# Patient Record
Sex: MALE | Race: WHITE | Employment: OTHER | URBAN - METROPOLITAN AREA
[De-identification: names, ages, dates, MRNs, and addresses within clinical notes are randomized per-mention and may not be internally consistent; named-entity substitution may affect disease eponyms.]

---

## 2017-01-25 RX ORDER — RAMIPRIL 10 MG/1
CAPSULE ORAL
Qty: 30 CAP | Refills: 6 | Status: SHIPPED | OUTPATIENT
Start: 2017-01-25 | End: 2017-04-27 | Stop reason: SDUPTHER

## 2017-03-21 ENCOUNTER — OFFICE VISIT (OUTPATIENT)
Dept: CARDIOLOGY CLINIC | Age: 77
End: 2017-03-21

## 2017-03-21 VITALS
DIASTOLIC BLOOD PRESSURE: 70 MMHG | HEART RATE: 57 BPM | HEIGHT: 73 IN | BODY MASS INDEX: 28.76 KG/M2 | WEIGHT: 217 LBS | SYSTOLIC BLOOD PRESSURE: 150 MMHG | OXYGEN SATURATION: 97 %

## 2017-03-21 DIAGNOSIS — R06.09 DOE (DYSPNEA ON EXERTION): ICD-10-CM

## 2017-03-21 DIAGNOSIS — E78.5 DYSLIPIDEMIA: ICD-10-CM

## 2017-03-21 DIAGNOSIS — Z90.5 HISTORY OF NEPHRECTOMY: ICD-10-CM

## 2017-03-21 DIAGNOSIS — N28.9 RENAL INSUFFICIENCY: ICD-10-CM

## 2017-03-21 DIAGNOSIS — I51.9 DIASTOLIC DYSFUNCTION, LEFT VENTRICLE: ICD-10-CM

## 2017-03-21 DIAGNOSIS — I82.402 DEEP VEIN THROMBOSIS (DVT) OF LEFT LOWER EXTREMITY, UNSPECIFIED CHRONICITY, UNSPECIFIED VEIN (HCC): ICD-10-CM

## 2017-03-21 DIAGNOSIS — I25.10 ATHEROSCLEROSIS OF NATIVE CORONARY ARTERY OF NATIVE HEART WITHOUT ANGINA PECTORIS: Primary | ICD-10-CM

## 2017-03-21 DIAGNOSIS — I11.9 BENIGN HYPERTENSIVE HEART DISEASE WITHOUT HEART FAILURE: ICD-10-CM

## 2017-03-21 DIAGNOSIS — M79.10 MYALGIA: ICD-10-CM

## 2017-03-21 NOTE — MR AVS SNAPSHOT
Visit Information Date & Time Provider Department Dept. Phone Encounter #  
 3/21/2017  2:00 PM James Nicholson MD [facility] [phone] [id number] Your Appointments 3/21/2017  2:00 PM  
Follow Up with James Nicholson MD  
[facility] ([facility]) Appt Note: R/s'd with the patient Manuel Lainez [phone] [phone] [address] [place] [address] [address] Upcoming Health Maintenance Date Due DTaP/Tdap/Td series (1 - Tdap) 5/6/1963 FOBT Q 1 YEAR AGE 50-75 5/6/1992 ZOSTER VACCINE AGE 60> 5/6/2002 GLAUCOMA SCREENING Q2Y 5/6/2007 Pneumococcal 65+ Low/Medium Risk (1 of 2 - PCV13) 5/6/2007 MEDICARE YEARLY EXAM 5/6/2007 INFLUENZA AGE 9 TO ADULT 8/1/2016 Allergies as of 3/21/2017  Review Complete On: 3/21/2017 By: James Nicholson MD  
  
 Severity Noted Reaction Type Reactions Lipitor [Atorvastatin]  02/02/2011    Myalgia Morphine  02/02/2011    Other (comments) Low blood pressure Niaspan [Niacin]  04/19/2011    Not Reported This Time Statins-hmg-coa Reductase Inhibitors  04/19/2011    Myalgia Zetia [Ezetimibe]  02/02/2011    Myalgia Current Immunizations  Never Reviewed No immunizations on file. Not reviewed this visit You Were Diagnosed With   
  
 Codes Comments Atherosclerosis of native coronary artery of native heart without angina pectoris    -  Primary ICD-10-CM: I25.10 ICD-9-CM: 414.01 Benign hypertensive heart disease without heart failure     ICD-10-CM: I11.9 ICD-9-CM: 402.10 Dyslipidemia     ICD-10-CM: E78.5 ICD-9-CM: 272.4 Deep vein thrombosis (DVT) of left lower extremity, unspecified chronicity, unspecified vein (HCC)     ICD-10-CM: I19.546 ICD-9-CM: 453.40 History of nephrectomy     ICD-10-CM: Z90.5 ICD-9-CM: V45.73 Renal insufficiency     ICD-10-CM: N28.9 ICD-9-CM: 593.9 Myalgia     ICD-10-CM: M79.1 ICD-9-CM: 729.1 DESAI (dyspnea on exertion)     ICD-10-CM: R06.09 
ICD-9-CM: 786.09 Diastolic dysfunction, left ventricle     ICD-10-CM: I51.9 ICD-9-CM: 429.9 Vitals BP Pulse Height(growth percentile) Weight(growth percentile) SpO2 BMI  
 150/70 (!) 57 6' 1\" (1.854 m) 217 lb (98.4 kg) 97% 28.63 kg/m2 Smoking Status Former Smoker Vitals History BMI and BSA Data Body Mass Index Body Surface Area  
 28.63 kg/m 2 2.25 m 2 Preferred Pharmacy Pharmacy Name Phone [facility] [address] [place] [address] [phone] Your Updated Medication List  
  
   
This list is accurate as of: 3/21/17 12:53 PM.  Always use your most recent med list.  
  
  
  
  
 ALBUTEROL IN Take 1 Puff by inhalation as needed. clopidogrel 75 mg Tab Commonly known as:  PLAVIX TAKE ONE TABLET BY MOUTH ONCE DAILY FISH OIL 1,000 mg (120 mg-180 mg) Cap Generic drug:  Omega-3-DHA-EPA-Fish Oil Take 2 Caps by mouth two (2) times a day. metoprolol succinate 100 mg tablet Commonly known as:  TOPROL-XL  
TAKE ONE TABLET BY MOUTH ONCE DAILY  
  
 ramipril 10 mg capsule Commonly known as:  ALTACE  
TAKE ONE CAPSULE BY MOUTH ONCE DAILY  
  
 VITAMIN D2 PO Take  by mouth. We Performed the Following AMB POC EKG ROUTINE W/ 12 LEADS, INTER & REP [22524 CPT(R)] Introducing [facility]! Gustabo Elliott introduces [employer] patient portal. Now you can access parts of your medical record, email your doctor's office, and request medication refills online. 1. In your internet browser, go to [email or web address] 2. Click on the First Time User? Click Here link in the Sign In box. You will see the New Member Sign Up page. 3. Enter your [employer] Access Code exactly as it appears below.  You will not need to use this code after youve completed the sign-up process. If you do not sign up before the expiration date, you must request a new code. · [employer] Access Code: [email or web address] Expires: 6/19/2017 12:53 PM 
 
4. Enter the last four digits of your Social Security Number (xxxx) and Date of Birth (mm/dd/yyyy) as indicated and click Submit. You will be taken to the next sign-up page. 5. Create a [employer] ID. This will be your [facility] [facility] login ID and cannot be changed, so think of one that is secure and easy to remember. 6. Create a [employer] password. You can change your password at any time. 7. Enter your Password Reset Question and Answer. This can be used at a later time if you forget your password. 8. Enter your e-mail address. You will receive e-mail notification when new information is available in [address] 9. Click Sign Up. You can now view and download portions of your medical record. 10. Click the Download Summary menu link to download a portable copy of your medical information. If you have questions, please visit the Frequently Asked Questions section of the [employer] website. Remember, [employer] is NOT to be used for urgent needs. For medical emergencies, dial 911. Now available from your iPhone and Android! Please provide this summary of care documentation to your next provider. Your primary care clinician is listed as [address] If you have any questions after today's visit, please call [phone].

## 2017-03-21 NOTE — PROGRESS NOTES
HISTORY OF PRESENT ILLNESS  Krista Vegas is a 78 y.o. male. HPI  He has been feeling good. He offers no cardiac complaints. Other than mild dyspnea on exertion chronically, he has no complaints. There has been no worsening of his dyspnea on exertion. He denies resting dyspnea, orthopnea or PND. He has had no palpitations, dizziness or syncope. He denies chest pain. He has had no symptoms to indicate TIA or amaurosis fugax. He has not been able to tolerate the statin at all. He has history of coronary artery disease and had cardiac catheterization on May 27, 2005, which demonstrated:    1. 50% stenosis of the mid-LAD. 2. 50% to 60% tubular stenosis at the ostium of the large 3rd obtuse marginal branch of the circumflex artery. 3. 95% stenosis of the mid right coronary artery. 4. Patent left renal artery. 5. Mildly depressed left ventricular systolic function, with EF in the 50% range, with no significant regional wall motion abnormalities. Subsequent stenting of the right coronary artery with a 3.5 x 18-mm Cypher stent was successful. The right ventricular marginal branch was jailed with stenosis and was balloon angioplastied, with 30% residual stenosis. He has had no recurrent angina since then. He has history of asthma and had some wheezing in the past, which has been stable since he has stopped smoking a pipe. He has history of a DVT of the left lower extremity in August 2003 and had been on Coumadin until May 2005. He underwent an adenosine Cardiolite myocardial perfusion scan on July 5, 2007, which demonstrated small scarring in the inferior, inferior septum, and inferior apex with no significant ischemia, which could be secondary to diaphragmatic attenuation since there were no wall motion abnormalities on gated SPECT imaging. LV function was normal with EF in the 66% range.  He had stress nuclear cardiac imaging as a Lexiscan on 5/17/11, which demonstrated a very small, fixed defect in the apex, most likely secondary to apical thinning, and mild, fixed defect in the inferior wall, most likely secondary to diaphragmatic attenuation. The LV function was normal, with EF in the 67% range. He had follow up stress nuclear cardiac imaging on 3/12/14, which demonstrated moderate sized mild fixed perfusion defect in the basal inferior, mid inferior, and inferoapical wall, which was felt to be related to diaphragmatic attenuation, essentially unchanged. The ejection fraction was in the 62% range. He was a pipe smoker until 2003 and he also had a great deal of exposure to secondhand smoke from his college roommate. He underwent stress nuclear cardiac imaging on 06/01/16 as pharmacological nuclear cardiac imaging, which demonstrated normal perfusion with no evidence of scarring or ischemia. Ejection fraction was in the 68% range. Review of Systems   Constitutional: Negative for malaise/fatigue and weight loss. HENT: Negative for hearing loss. Eyes: Negative for blurred vision and double vision. Respiratory: Negative for shortness of breath. Cardiovascular: Negative for chest pain, palpitations, orthopnea, claudication, leg swelling and PND. Gastrointestinal: Negative for blood in stool, heartburn and melena. Genitourinary: Positive for frequency. Negative for dysuria, hematuria and urgency. Musculoskeletal: Negative for back pain and joint pain. Skin: Negative for itching and rash. Neurological: Negative for dizziness, loss of consciousness, weakness and headaches. Psychiatric/Behavioral: Negative for depression and memory loss. Physical Exam   Constitutional: He is oriented to person, place, and time. He appears well-developed and well-nourished. HENT:   Head: Normocephalic and atraumatic. Eyes: Conjunctivae are normal. Pupils are equal, round, and reactive to light. Neck: Normal range of motion. Neck supple. No JVD present.    Cardiovascular: Normal rate, regular rhythm, S1 normal and S2 normal.   No extrasystoles are present. PMI is not displaced. Exam reveals no friction rub. Murmur heard. Harsh early systolic murmur is present with a grade of 1/6  at the upper right sternal border  Pulses:       Carotid pulses are 3+ on the right side, and 3+ on the left side. Pulmonary/Chest: Effort normal. He has no rales. Abdominal: Soft. There is no tenderness. Musculoskeletal: He exhibits no edema. Neurological: He is alert and oriented to person, place, and time. No cranial nerve deficit. Skin: Skin is warm and dry. Psychiatric: He has a normal mood and affect. His behavior is normal.     Visit Vitals    /70    Pulse (!) 57    Ht 6' 1\" (1.854 m)    Wt 98.4 kg (217 lb)    SpO2 97%    BMI 28.63 kg/m2       Past Medical History:   Diagnosis Date    Asthma     Cardiac catheterization 05/27/2005    LM patent. mLAD 50%. Cx mild. OM3 55%. mRCA 95% (Cypher 3.5 x 18 DAJA). EF 50%. Patent left renal.    Cardiac Holter monitor, normal 06/21/2010    Benign Holter study.  Cardiac nuclear imaging test, low risk 06/01/2016    Low risk. No ischemia or prior infarction. No WMA. EF 68%. Neg EKG on pharm stress test.    Coronary artery disease May 2005    status post stenting of the right coronary artery     DVT (deep venous thrombosis) ([facility] [address] August 2003    left lower extremity    Dyslipidemia     History of nephrectomy 1998    right, secondary to cancer    History of vasectomy     Hypertension      Renal insufficiency     mild, with a creatinine of 1.6 prior to cardiac catheterization in May 2005       Social History     Social History    Marital status:      Spouse name: N/A    Number of children: N/A    Years of education: N/A     Occupational History    Not on file.      Social History Main Topics    Smoking status: Former Smoker     Years: 40.00     Types: Pipe     Quit date: 4/19/2008    Smokeless tobacco: Never Used   [address] [place] [address] Alcohol use No    Drug use: No    Sexual activity: Not on file     Other Topics Concern    Not on file     Social History Narrative       Family History   Problem Relation Age of Onset    Heart Attack Mother       at age 79 from MI. She also had enlarged heart and asthma.  Hypertension Mother     Cancer Father      Liver cancer       Past Surgical History:   Procedure Laterality Date    HX CORONARY STENT PLACEMENT  05    RCA with 3.5 x 18-mm Cypher stent      HX HEART CATHETERIZATION  05    HX NEPHRECTOMY      right     HX VASECTOMY         Current Outpatient Prescriptions   Medication Sig Dispense Refill    ramipril (ALTACE) 10 mg capsule TAKE ONE CAPSULE BY MOUTH ONCE DAILY 30 Cap 6    metoprolol succinate (TOPROL-XL) 100 mg tablet TAKE ONE TABLET BY MOUTH ONCE DAILY 30 Tab 8    clopidogrel (PLAVIX) 75 mg tablet TAKE ONE TABLET BY MOUTH ONCE DAILY 30 Tab 6    ERGOCALCIFEROL, VITAMIN D2, (VITAMIN D2 PO) Take  by mouth.  Omega-3-DHA-EPA-Fish Oil (FISH OIL) 1,000 (120-180) mg Cap Take 2 Caps by mouth two (2) times a day.  ALBUTEROL IN Take 1 Puff by inhalation as needed. EKG: normal EKG, normal sinus rhythm, unchanged from previous tracings, sinus bradycardia, upper limit ID  .  ASSESSMENT and PLAN  Encounter Diagnoses   Name Primary?  Coronary artery disease, status post stenting of the RCA in May 2005/EF 50%. Yes    Hypertension      Dyslipidemia     History of DVT (deep venous thrombosis) in left lower extremity.  History of right nephrectomy due to renal cell carcinoma in .  Mild renal insufficiency     Myalgia, related to statin.  DESAI (dyspnea on exertion)     Diastolic dysfunction, left ventricle    Cardiac wise, he has been doing very well. He has had no symptoms to indicate angina or cardiac decompensation. He has not been able to tolerate the statin because of severe myalgia and he is not willing to consider a retrial of statin. His mild dyspnea on exertion seems to be secondary to left ventricular diastolic dysfunction and for which he was advised to exercise more. There appears to be no indication for diuretic treatment at this time.

## 2017-03-21 NOTE — PROGRESS NOTES
1. Have you been to the ER, urgent care clinic since your last visit? Hospitalized since your last visit? no  2. Have you seen or consulted any other health care providers outside of the [address] [place] [address] since your last visit? Include any pap smears or colon screening.   no

## 2017-04-27 RX ORDER — RAMIPRIL 10 MG/1
10 CAPSULE ORAL DAILY
Qty: 90 CAP | Refills: 3 | Status: SHIPPED | OUTPATIENT
Start: 2017-04-27 | End: 2018-05-15 | Stop reason: SDUPTHER

## 2017-05-08 RX ORDER — CLOPIDOGREL BISULFATE 75 MG/1
TABLET ORAL
Qty: 30 TAB | Refills: 11 | Status: SHIPPED | OUTPATIENT
Start: 2017-05-08 | End: 2018-05-18 | Stop reason: SDUPTHER

## 2017-07-17 RX ORDER — METOPROLOL SUCCINATE 100 MG/1
TABLET, EXTENDED RELEASE ORAL
Qty: 30 TAB | Refills: 8 | Status: SHIPPED | OUTPATIENT
Start: 2017-07-17 | End: 2018-03-07 | Stop reason: ALTCHOICE

## 2018-03-07 ENCOUNTER — OFFICE VISIT (OUTPATIENT)
Dept: CARDIOLOGY CLINIC | Age: 78
End: 2018-03-07

## 2018-03-07 VITALS
SYSTOLIC BLOOD PRESSURE: 170 MMHG | OXYGEN SATURATION: 96 % | HEIGHT: 73 IN | WEIGHT: 221 LBS | BODY MASS INDEX: 29.29 KG/M2 | HEART RATE: 67 BPM | DIASTOLIC BLOOD PRESSURE: 70 MMHG

## 2018-03-07 DIAGNOSIS — N28.9 RENAL INSUFFICIENCY: ICD-10-CM

## 2018-03-07 DIAGNOSIS — E78.5 DYSLIPIDEMIA: Primary | ICD-10-CM

## 2018-03-07 DIAGNOSIS — I82.402 DEEP VEIN THROMBOSIS (DVT) OF LEFT LOWER EXTREMITY, UNSPECIFIED CHRONICITY, UNSPECIFIED VEIN (HCC): ICD-10-CM

## 2018-03-07 DIAGNOSIS — I25.10 ATHEROSCLEROSIS OF NATIVE CORONARY ARTERY OF NATIVE HEART WITHOUT ANGINA PECTORIS: Primary | ICD-10-CM

## 2018-03-07 DIAGNOSIS — I11.9 BENIGN HYPERTENSIVE HEART DISEASE WITHOUT HEART FAILURE: ICD-10-CM

## 2018-03-07 DIAGNOSIS — M79.10 MYALGIA: ICD-10-CM

## 2018-03-07 DIAGNOSIS — R06.09 DOE (DYSPNEA ON EXERTION): ICD-10-CM

## 2018-03-07 DIAGNOSIS — I51.9 DIASTOLIC DYSFUNCTION, LEFT VENTRICLE: ICD-10-CM

## 2018-03-07 DIAGNOSIS — Z90.5 HISTORY OF NEPHRECTOMY: ICD-10-CM

## 2018-03-07 DIAGNOSIS — E78.5 DYSLIPIDEMIA: ICD-10-CM

## 2018-03-07 RX ORDER — CARVEDILOL 25 MG/1
25 TABLET ORAL 2 TIMES DAILY WITH MEALS
Qty: 180 TAB | Refills: 3 | Status: SHIPPED | OUTPATIENT
Start: 2018-03-07 | End: 2019-04-16 | Stop reason: SDUPTHER

## 2018-03-07 NOTE — MR AVS SNAPSHOT
[facility] [address] [address] [address] [id number] [phone] Patient: John Bryant MRN: [id number] Visit Information Date & Time Provider Department Dept. Phone Encounter #  
 3/7/2018  9:20 AM Ghassan Ford MD [facility] [phone] [id number] Your Appointments 3/23/2018  2:30 PM  
Nurse Visit with Bp Hv Csi [facility] ([address]) Appt Note: 2 weeks BP and HR check with a nurse Manuel [address] [address] [id number] [phone] [address] [place] [phone]  
  
    
 9/10/2018  3:00 PM  
Follow Up with Ghassan Ford MD  
[facility] ([address]) Appt Note: 6 month follow up [facility] [address] [place] [address] [phone] [facility] [address] Upcoming Health Maintenance Date Due DTaP/Tdap/Td series (1 - Tdap) 5/6/1963 ZOSTER VACCINE AGE 60> 3/6/2002 GLAUCOMA SCREENING Q2Y 5/6/2007 Pneumococcal 65+ Low/Medium Risk (1 of 2 - PCV13) 5/6/2007 MEDICARE YEARLY EXAM 5/6/2007 Influenza Age 5 to Adult 8/1/2017 Allergies as of 3/7/2018  Review Complete On: 3/7/2018 By: Ghassan Ford MD  
  
 Severity Noted Reaction Type Reactions Lipitor [Atorvastatin]  02/02/2011    Myalgia Morphine  02/02/2011    Other (comments) Low blood pressure Niaspan [Niacin]  04/19/2011    Not Reported This Time Statins-hmg-coa Reductase Inhibitors  04/19/2011    Myalgia Zetia [Ezetimibe]  02/02/2011    Myalgia Current Immunizations  Never Reviewed No immunizations on file. Not reviewed this visit You Were Diagnosed With   
  
 Codes Comments  Atherosclerosis of native coronary artery of native heart without angina pectoris    -  Primary ICD-10-CM: I25.10 ICD-9-CM: 414.01 Benign hypertensive heart disease without heart failure     ICD-10-CM: I11.9 ICD-9-CM: 402.10 Dyslipidemia     ICD-10-CM: E78.5 ICD-9-CM: 272.4 Deep vein thrombosis (DVT) of left lower extremity, unspecified chronicity, unspecified vein (HCC)     ICD-10-CM: A92.967 ICD-9-CM: 453.40 History of nephrectomy     ICD-10-CM: Z90.5 ICD-9-CM: V45.73 Renal insufficiency     ICD-10-CM: N28.9 ICD-9-CM: 593.9 Myalgia     ICD-10-CM: M79.1 ICD-9-CM: 729.1 DESAI (dyspnea on exertion)     ICD-10-CM: R06.09 
ICD-9-CM: 786.09 Diastolic dysfunction, left ventricle     ICD-10-CM: I51.9 ICD-9-CM: 429.9 Vitals BP Pulse Height(growth percentile) Weight(growth percentile) SpO2 BMI  
 170/70 67 6' 1\" (1.854 m) 221 lb (100.2 kg) 96% 29.16 kg/m2 Smoking Status Former Smoker Vitals History BMI and BSA Data Body Mass Index Body Surface Area  
 29.16 kg/m 2 2.27 m 2 Preferred Pharmacy Pharmacy Name Phone [address] [place] [address] [phone] Your Updated Medication List  
  
   
This list is accurate as of 3/7/18 10:40 AM.  Always use your most recent med list.  
  
  
  
  
 ALBUTEROL IN Take 1 Puff by inhalation as needed. clopidogrel 75 mg Tab Commonly known as:  PLAVIX TAKE ONE TABLET BY MOUTH ONCE DAILY FISH OIL 1,000 mg (120 mg-180 mg) Cap Generic drug:  Omega-3-DHA-EPA-Fish Oil Take 2 Caps by mouth two (2) times a day. ramipril 10 mg capsule Commonly known as:  ALTACE Take 1 Cap by mouth daily. VITAMIN D2 PO Take  by mouth. We Performed the Following AMB POC EKG ROUTINE W/ 12 LEADS, INTER & REP [41361 CPT(R)] Patient Instructions Follow up in 6 months [place] pharm CAD prior to next visit - we will call you Stop metoprolol Start Carvedilol 25 mg 2 times a day LIPID panel for Repatha (cholesterol medication) Introducing [facility]! Raquel Gomez introduces [employer] patient portal. Now you can access parts of your medical record, email your doctor's office, and request medication refills online. 1. In your internet browser, go to [email or web address] 2. Click on the First Time User? Click Here link in the Sign In box. You will see the New Member Sign Up page. 3. Enter your [employer] Access Code exactly as it appears below. You will not need to use this code after youve completed the sign-up process. If you do not sign up before the expiration date, you must request a new code. · [employer] Access Code: [email or web address] Expires: 6/5/2018  8:50 AM 
 
4. Enter the last four digits of your Social Security Number (xxxx) and Date of Birth (mm/dd/yyyy) as indicated and click Submit. You will be taken to the next sign-up page. 5. Create a [employer] ID. This will be your [employer] login ID and cannot be changed, so think of one that is secure and easy to remember. 6. Create a [employer] password. You can change your password at any time. 7. Enter your Password Reset Question and Answer. This can be used at a later time if you forget your password. 8. Enter your e-mail address. You will receive e-mail notification when new information is available in [address] 9. Click Sign Up. You can now view and download portions of your medical record. 10. Click the Download Summary menu link to download a portable copy of your medical information. If you have questions, please visit the Frequently Asked Questions section of the [employer] website. Remember, [employer] is NOT to be used for urgent needs. For medical emergencies, dial 911. Now available from your iPhone and Android! Please provide this summary of care documentation to your next provider. Your primary care clinician is listed as [address]  If you have any questions after today's visit, please call [phone].

## 2018-03-07 NOTE — PATIENT INSTRUCTIONS
Follow up in 6 months  [place] pharm CAD prior to next visit - we will call you   Stop metoprolol   Start Carvedilol 25 mg 2 times a day     LIPID panel for Repatha (cholesterol medication)

## 2018-03-07 NOTE — PROGRESS NOTES
HISTORY OF PRESENT ILLNESS  Benigno Gutierrez is a 78 y.o. male. HPI  Cardiac-wise, he has been feeling well. He denies chest pain, resting dyspnea, orthopnea, PND. He continues with mild chronic dyspnea on exertion which has not changed much. He denies palpitation, dizziness or syncope. He denies any symptoms of TIA or amaurosis fugax. He has not been able to tolerate statin at all. His blood pressure has been mildly out of control. He has history of coronary artery disease and had cardiac catheterization on May 27, 2005, which demonstrated:    1. 50% stenosis of the mid-LAD. 2. 50% to 60% tubular stenosis at the ostium of the large 3rd obtuse marginal branch of the circumflex artery. 3. 95% stenosis of the mid right coronary artery. 4. Patent left renal artery. 5. Mildly depressed left ventricular systolic function, with EF in the 50% range, with no significant regional wall motion abnormalities. Subsequent stenting of the right coronary artery with a 3.5 x 18-mm Cypher stent was successful. The right ventricular marginal branch was jailed with stenosis and was balloon angioplastied, with 30% residual stenosis. He has had no recurrent angina since then. He has history of asthma and had some wheezing in the past, which has been stable since he has stopped smoking a pipe. He has history of a DVT of the left lower extremity in August 2003 and had been on Coumadin until May 2005. He underwent an adenosine Cardiolite myocardial perfusion scan on July 5, 2007, which demonstrated small scarring in the inferior, inferior septum, and inferior apex with no significant ischemia, which could be secondary to diaphragmatic attenuation since there were no wall motion abnormalities on gated SPECT imaging. LV function was normal with EF in the 66% range.  He had stress nuclear cardiac imaging as a Lexiscan on 5/17/11, which demonstrated a very small, fixed defect in the apex, most likely secondary to apical thinning, and mild, fixed defect in the inferior wall, most likely secondary to diaphragmatic attenuation. The LV function was normal, with EF in the 67% range. He had follow up stress nuclear cardiac imaging on 3/12/14, which demonstrated moderate sized mild fixed perfusion defect in the basal inferior, mid inferior, and inferoapical wall, which was felt to be related to diaphragmatic attenuation, essentially unchanged. The ejection fraction was in the 62% range. He was a pipe smoker until 2003 and he also had a great deal of exposure to secondhand smoke from his college roommate. He underwent stress nuclear cardiac imaging on 06/01/16 as pharmacological nuclear cardiac imaging, which demonstrated normal perfusion with no evidence of scarring or ischemia. Ejection fraction was in the 68% range. Review of Systems   Constitutional: Negative for malaise/fatigue and weight loss. HENT: Negative for hearing loss. Eyes: Negative for blurred vision and double vision. Respiratory: Positive for shortness of breath. Cardiovascular: Negative for chest pain, palpitations, orthopnea, claudication, leg swelling and PND. Gastrointestinal: Negative for blood in stool, heartburn and melena. Genitourinary: Positive for frequency. Negative for dysuria, hematuria and urgency. Musculoskeletal: Positive for joint pain. Negative for back pain. Skin: Negative for itching and rash. Neurological: Negative for dizziness, loss of consciousness and weakness. Psychiatric/Behavioral: Negative for depression and memory loss. Physical Exam   Constitutional: He is oriented to person, place, and time. He appears well-developed and well-nourished. HENT:   Head: Normocephalic and atraumatic. Eyes: Conjunctivae are normal. Pupils are equal, round, and reactive to light. Neck: Normal range of motion. No JVD present. Cardiovascular: Normal rate, regular rhythm and S1 normal.   No extrasystoles are present. PMI is not displaced. Exam reveals no gallop and no friction rub. Murmur heard. Harsh early systolic murmur is present with a grade of 1/6  at the upper right sternal border  Pulses:       Carotid pulses are 3+ on the right side, and 3+ on the left side. Pulmonary/Chest: Effort normal. He has no rales. Abdominal: Soft. There is no tenderness. Musculoskeletal: He exhibits no edema. Neurological: He is alert and oriented to person, place, and time. No cranial nerve deficit. Skin: Skin is warm and dry. Psychiatric: He has a normal mood and affect. His behavior is normal.     Visit Vitals    /70    Pulse 67    Ht 6' 1\" (1.854 m)    Wt 100.2 kg (221 lb)    SpO2 96%    BMI 29.16 kg/m2       Past Medical History:   Diagnosis Date    Asthma     Cardiac catheterization 05/27/2005    LM patent. mLAD 50%. Cx mild. OM3 55%. mRCA 95% (Cypher 3.5 x 18 DAJA). EF 50%. Patent left renal.    Cardiac Holter monitor, normal 06/21/2010    Benign Holter study.  Cardiac nuclear imaging test, low risk 06/01/2016    Low risk. No ischemia or prior infarction. No WMA. EF 68%. Neg EKG on pharm stress test.    Coronary artery disease May 2005    status post stenting of the right coronary artery     DVT (deep venous thrombosis) ([facility] [address] August 2003    left lower extremity    Dyslipidemia     History of nephrectomy 1998    right, secondary to cancer    History of vasectomy     Hypertension      Renal insufficiency     mild, with a creatinine of 1.6 prior to cardiac catheterization in May 2005       Social History     Social History    Marital status:      Spouse name: N/A    Number of children: N/A    Years of education: N/A     Occupational History    Not on file.      Social History Main Topics    Smoking status: Former Smoker     Years: 40.00     Types: Pipe     Quit date: 4/19/2008    Smokeless tobacco: Never Used    Alcohol use No    Drug use: No    Sexual activity: Not on file     Other Topics Concern    Not on file     Social History Narrative       Family History   Problem Relation Age of Onset    Heart Attack Mother       at age 79 from MI. She also had enlarged heart and asthma.  Hypertension Mother     Cancer Father      Liver cancer       Past Surgical History:   Procedure Laterality Date    HX CORONARY STENT PLACEMENT  05    RCA with 3.5 x 18-mm Cypher stent      HX HEART CATHETERIZATION  05    HX NEPHRECTOMY      right     HX VASECTOMY         Current Outpatient Prescriptions   Medication Sig Dispense Refill    metoprolol succinate (TOPROL-XL) 100 mg tablet TAKE ONE TABLET BY MOUTH ONCE DAILY 30 Tab 8    clopidogrel (PLAVIX) 75 mg tab TAKE ONE TABLET BY MOUTH ONCE DAILY 30 Tab 11    ramipril (ALTACE) 10 mg capsule Take 1 Cap by mouth daily. 90 Cap 3    ERGOCALCIFEROL, VITAMIN D2, (VITAMIN D2 PO) Take  by mouth.  Omega-3-DHA-EPA-Fish Oil (FISH OIL) 1,000 (120-180) mg Cap Take 2 Caps by mouth two (2) times a day.  ALBUTEROL IN Take 1 Puff by inhalation as needed. EKG: normal EKG, normal sinus rhythm, unchanged from previous tracings, upper limit PA  .  ASSESSMENT and PLAN  Encounter Diagnoses   Name Primary?  Atherosclerosis of native coronary artery of native heart without angina pectoris,status post stenting of the RCA in May 2005/EF 50%. Yes    Hypertension      Dyslipidemia     Deep vein thrombosis (DVT) of left lower extremity, unspecified chronicity, unspecified vein (HCC)     History of right nephrectomy due to renal cell carcinoma in .  Mild renal insufficiency     Myalgia, related to statin.  DESAI (dyspnea on exertion)     Diastolic dysfunction, left ventricle    He has been doing quite well. He has had no symptoms to indicate angina or cardiac decompensation. He has dyspnea on exertion which is related to left ventricular diastolic dysfunction and once again encouraged to do more exercise.  He has not been able to tolerate statin at all because of severe myalgia and at this time I would try to apply for a  PCSK9 inhibitor. His blood pressure remains mildly out of control and at this time I would replace Metoprolol with Carvedilol 25 mg twice a day with follow up of blood pressure and heart rate. He will have follow up stress nuclear cardiac imaging in six months or so.

## 2018-03-07 NOTE — PROGRESS NOTES
1. Have you been to the ER, urgent care clinic since your last visit? Hospitalized since your last visit? No     2. Have you seen or consulted any other health care providers outside of the [address] [place] [address] since your last visit? Include any pap smears or colon screening.  No

## 2018-03-08 ENCOUNTER — HOSPITAL ENCOUNTER (OUTPATIENT)
Dept: LAB | Age: 78
Discharge: HOME | End: 2018-03-08
Payer: MEDICARE

## 2018-03-08 LAB
CHOLEST SERPL-MCNC: 217 MG/DL
HDLC SERPL-MCNC: 36 MG/DL (ref 40–60)
HDLC SERPL: 6 {RATIO} (ref 0–5)
LDLC SERPL CALC-MCNC: 126.2 MG/DL (ref 0–100)
LIPID PROFILE,FLP: ABNORMAL
TRIGL SERPL-MCNC: 274 MG/DL (ref ?–150)
VLDLC SERPL CALC-MCNC: 54.8 MG/DL

## 2018-03-08 PROCEDURE — 80061 LIPID PANEL: CPT | Performed by: INTERNAL MEDICINE

## 2018-03-08 PROCEDURE — 36415 COLL VENOUS BLD VENIPUNCTURE: CPT | Performed by: INTERNAL MEDICINE

## 2018-03-23 ENCOUNTER — CLINICAL SUPPORT (OUTPATIENT)
Dept: CARDIOLOGY CLINIC | Age: 78
End: 2018-03-23

## 2018-03-23 VITALS
SYSTOLIC BLOOD PRESSURE: 162 MMHG | HEART RATE: 66 BPM | OXYGEN SATURATION: 98 % | WEIGHT: 222 LBS | HEIGHT: 73 IN | DIASTOLIC BLOOD PRESSURE: 90 MMHG | BODY MASS INDEX: 29.42 KG/M2

## 2018-03-23 DIAGNOSIS — I11.9 BENIGN HYPERTENSIVE HEART DISEASE WITHOUT HEART FAILURE: Primary | ICD-10-CM

## 2018-03-23 RX ORDER — RAMIPRIL 10 MG/1
10 CAPSULE ORAL 2 TIMES DAILY
Qty: 180 CAP | Refills: 1 | Status: CANCELLED | OUTPATIENT
Start: 2018-03-23

## 2018-03-23 NOTE — PROGRESS NOTES
Shorty Mccracken is a 78 y.o. male that is here for a blood pressure check. His current medications are listed below. Current Outpatient Prescriptions   Medication Sig    alirocumab (PRALUENT PEN) 150 mg/mL injector pen 1 mL by SubCUTAneous route Once every 2 weeks.  carvedilol (COREG) 25 mg tablet Take 1 Tab by mouth two (2) times daily (with meals).  clopidogrel (PLAVIX) 75 mg tab TAKE ONE TABLET BY MOUTH ONCE DAILY    ramipril (ALTACE) 10 mg capsule Take 1 Cap by mouth daily. (Patient taking differently: Take 10 mg by mouth two (2) times a day.)    ERGOCALCIFEROL, VITAMIN D2, (VITAMIN D2 PO) Take  by mouth.  Omega-3-DHA-EPA-Fish Oil (FISH OIL) 1,000 (120-180) mg Cap Take 2 Caps by mouth two (2) times a day.  ALBUTEROL IN Take 1 Puff by inhalation as needed. No current facility-administered medications for this visit. His   Visit Vitals    /90    Pulse 66    Ht 6' 1\" (1.854 m)    Wt 100.7 kg (222 lb)    SpO2 98%    BMI 29.29 kg/m2     Patient was seen in the today for a bp check. Patient discontiued metoprolol succ 100mg at last office visit on 3/7/18. Patient was then prescribed coreg 25mg 1 tab twice a day. Patient states he takes his coreg 9am and 7pm. Patient denies chest pain, palpitations, dizziness, swelling. Patient states he has had an increase in sob and fatigue since starting coreg. Patient also states he has noticed an increased in itching, without rash or hives. (patient has psoriasis) Patient's bp remained high at today's visit. I consulted with Dr Enoc Stuart and he advised for patient to increase ramipril 10mg from 1 tab daily to 1 tab twice daily. He also wants patient to come back in two weeks and have a BMP done.

## 2018-03-26 ENCOUNTER — HOSPITAL ENCOUNTER (OUTPATIENT)
Dept: LAB | Age: 78
Discharge: HOME | End: 2018-03-26
Payer: MEDICARE

## 2018-03-26 DIAGNOSIS — I11.9 BENIGN HYPERTENSIVE HEART DISEASE WITHOUT HEART FAILURE: ICD-10-CM

## 2018-03-26 DIAGNOSIS — E78.5 DYSLIPIDEMIA: ICD-10-CM

## 2018-03-26 LAB
ANION GAP SERPL CALC-SCNC: 8 MMOL/L (ref 3–18)
BUN SERPL-MCNC: 25 MG/DL (ref 7–18)
BUN/CREAT SERPL: 16 (ref 12–20)
CALCIUM SERPL-MCNC: 9.5 MG/DL (ref 8.5–10.1)
CHLORIDE SERPL-SCNC: 104 MMOL/L (ref 100–108)
CO2 SERPL-SCNC: 25 MMOL/L (ref 21–32)
CREAT SERPL-MCNC: 1.61 MG/DL (ref 0.6–1.3)
GLUCOSE SERPL-MCNC: 199 MG/DL (ref 74–99)
POTASSIUM SERPL-SCNC: 5.5 MMOL/L (ref 3.5–5.5)
SODIUM SERPL-SCNC: 137 MMOL/L (ref 136–145)

## 2018-03-26 PROCEDURE — 80048 BASIC METABOLIC PNL TOTAL CA: CPT | Performed by: INTERNAL MEDICINE

## 2018-03-26 PROCEDURE — 36415 COLL VENOUS BLD VENIPUNCTURE: CPT | Performed by: INTERNAL MEDICINE

## 2018-03-28 ENCOUNTER — TELEPHONE (OUTPATIENT)
Dept: CARDIOLOGY CLINIC | Age: 78
End: 2018-03-28

## 2018-03-28 DIAGNOSIS — I25.10 ATHEROSCLEROSIS OF NATIVE CORONARY ARTERY OF NATIVE HEART WITHOUT ANGINA PECTORIS: Primary | ICD-10-CM

## 2018-03-28 NOTE — TELEPHONE ENCOUNTER
LM for patient to return call. Patient had blood work done too earlier, we would like for patient to have blood work done closer to BP follow up appt on 4/9. I have reordered blood work for patient to have done around 4/06/18.  (before appt)

## 2018-04-06 ENCOUNTER — HOSPITAL ENCOUNTER (OUTPATIENT)
Dept: LAB | Age: 78
Discharge: HOME | End: 2018-04-06
Payer: MEDICARE

## 2018-04-06 DIAGNOSIS — I25.10 ATHEROSCLEROSIS OF NATIVE CORONARY ARTERY OF NATIVE HEART WITHOUT ANGINA PECTORIS: ICD-10-CM

## 2018-04-06 LAB
ANION GAP SERPL CALC-SCNC: 9 MMOL/L (ref 3–18)
BUN SERPL-MCNC: 30 MG/DL (ref 7–18)
BUN/CREAT SERPL: 17 (ref 12–20)
CALCIUM SERPL-MCNC: 9.8 MG/DL (ref 8.5–10.1)
CHLORIDE SERPL-SCNC: 104 MMOL/L (ref 100–108)
CO2 SERPL-SCNC: 26 MMOL/L (ref 21–32)
CREAT SERPL-MCNC: 1.77 MG/DL (ref 0.6–1.3)
GLUCOSE SERPL-MCNC: 202 MG/DL (ref 74–99)
POTASSIUM SERPL-SCNC: 5.1 MMOL/L (ref 3.5–5.5)
SODIUM SERPL-SCNC: 139 MMOL/L (ref 136–145)

## 2018-04-06 PROCEDURE — 36415 COLL VENOUS BLD VENIPUNCTURE: CPT | Performed by: INTERNAL MEDICINE

## 2018-04-06 PROCEDURE — 80048 BASIC METABOLIC PNL TOTAL CA: CPT | Performed by: INTERNAL MEDICINE

## 2018-04-09 ENCOUNTER — CLINICAL SUPPORT (OUTPATIENT)
Dept: CARDIOLOGY CLINIC | Age: 78
End: 2018-04-09

## 2018-04-09 VITALS
WEIGHT: 222 LBS | DIASTOLIC BLOOD PRESSURE: 70 MMHG | OXYGEN SATURATION: 97 % | SYSTOLIC BLOOD PRESSURE: 134 MMHG | HEART RATE: 63 BPM | HEIGHT: 73 IN | BODY MASS INDEX: 29.42 KG/M2

## 2018-04-09 DIAGNOSIS — I11.9 BENIGN HYPERTENSIVE HEART DISEASE WITHOUT HEART FAILURE: Primary | ICD-10-CM

## 2018-04-09 NOTE — PROGRESS NOTES
Rolando Sullivan is a 78 y.o. male that is here for a blood pressure check after increasing Ramipril 10mg from 1 tab daily to 1 tab twice daily at last bp check visit and post bmp. His current medications are listed below. Current Outpatient Prescriptions   Medication Sig    alirocumab (PRALUENT PEN) 150 mg/mL injector pen 1 mL by SubCUTAneous route Once every 2 weeks. (Patient taking differently: 150 mg by SubCUTAneous route Once every 2 weeks. Indications: Patient decided not to take this med)    carvedilol (COREG) 25 mg tablet Take 1 Tab by mouth two (2) times daily (with meals).  clopidogrel (PLAVIX) 75 mg tab TAKE ONE TABLET BY MOUTH ONCE DAILY    ramipril (ALTACE) 10 mg capsule Take 1 Cap by mouth daily. (Patient taking differently: Take 10 mg by mouth two (2) times a day.)    ERGOCALCIFEROL, VITAMIN D2, (VITAMIN D2 PO) Take  by mouth.  Omega-3-DHA-EPA-Fish Oil (FISH OIL) 1,000 (120-180) mg Cap Take 2 Caps by mouth two (2) times a day.  ALBUTEROL IN Take 1 Puff by inhalation as needed. No current facility-administered medications for this visit. His   Visit Vitals    /70    Pulse 63    Ht 6' 1\" (1.854 m)    Wt 100.7 kg (222 lb)    SpO2 97%    BMI 29.29 kg/m2       Patient was seen in the office today for a blood pressure follow up. At last bp visit on 3/23/2018 patient's bp was 162/90 HR 66. At that time,(in absence of Dr Yvrose Shahid) Dr Tricia Martinez increased Ramipril 10mg  from 1 tab daily to 1 tab twice daily and ordered a repeat bmp to be done prior to today's visit. At the visit today, patient denies having any cardiac symptoms such as chest pain, palpitations, dizziness, SOB or swelling and bp has was down to 134/70. Patient states he is taking all medication as prescribed my his physicians with the exception of Praluent pen. Patient states he is 78years old and does not wish to stick himself and expresses no desire to use that medication at this time.  Reviewed patient's last lipid panel from 3/8/2018 with patient at his request. Educated patient on reducing high fat foods and increasing exercise could improve cholesterol levels. Reviewed patient's bmp labs with patient and informed him of Dr Reese Blind notes regarding his last BMP:    Notes Recorded by Morena Pichardo MD on 4/9/2018 at 12:58 PM  Increase fluid intake and reduce K+ intake. Repeat in 2 weeks. Educated patient on foods high in K+ and instructed patient to avoid them, increase fluid intake and repeat bmp labs in 2 weeks. Patient agreed and indicated understanding to all topics discussed at today's visit.

## 2018-04-23 ENCOUNTER — HOSPITAL ENCOUNTER (OUTPATIENT)
Dept: LAB | Age: 78
Discharge: HOME | End: 2018-04-23
Payer: MEDICARE

## 2018-04-23 DIAGNOSIS — I11.9 BENIGN HYPERTENSIVE HEART DISEASE WITHOUT HEART FAILURE: ICD-10-CM

## 2018-04-23 LAB
ALBUMIN SERPL-MCNC: 3.7 G/DL (ref 3.4–5)
ALBUMIN/GLOB SERPL: 1 {RATIO} (ref 0.8–1.7)
ALP SERPL-CCNC: 76 U/L (ref 45–117)
ALT SERPL-CCNC: 23 U/L (ref 16–61)
ANION GAP SERPL CALC-SCNC: 6 MMOL/L (ref 3–18)
AST SERPL-CCNC: 10 U/L (ref 15–37)
BILIRUB SERPL-MCNC: 0.4 MG/DL (ref 0.2–1)
BUN SERPL-MCNC: 26 MG/DL (ref 7–18)
BUN/CREAT SERPL: 16 (ref 12–20)
CALCIUM SERPL-MCNC: 9.2 MG/DL (ref 8.5–10.1)
CHLORIDE SERPL-SCNC: 108 MMOL/L (ref 100–108)
CO2 SERPL-SCNC: 26 MMOL/L (ref 21–32)
CREAT SERPL-MCNC: 1.59 MG/DL (ref 0.6–1.3)
GLOBULIN SER CALC-MCNC: 3.6 G/DL (ref 2–4)
GLUCOSE SERPL-MCNC: 230 MG/DL (ref 74–99)
POTASSIUM SERPL-SCNC: 4.9 MMOL/L (ref 3.5–5.5)
PROT SERPL-MCNC: 7.3 G/DL (ref 6.4–8.2)
SODIUM SERPL-SCNC: 140 MMOL/L (ref 136–145)

## 2018-04-23 PROCEDURE — 36415 COLL VENOUS BLD VENIPUNCTURE: CPT | Performed by: INTERNAL MEDICINE

## 2018-04-23 PROCEDURE — 80053 COMPREHEN METABOLIC PANEL: CPT | Performed by: INTERNAL MEDICINE

## 2018-04-25 ENCOUNTER — TELEPHONE (OUTPATIENT)
Dept: CARDIOLOGY CLINIC | Age: 78
End: 2018-04-25

## 2018-04-25 NOTE — TELEPHONE ENCOUNTER
----- Message from Tisha Palomo MD sent at 4/23/2018  7:05 PM EDT -----  Alycia Garay. renal function essentially unchanged. Slightly better.

## 2018-05-15 RX ORDER — RAMIPRIL 10 MG/1
10 CAPSULE ORAL 2 TIMES DAILY
Qty: 180 CAP | Refills: 3 | Status: SHIPPED | OUTPATIENT
Start: 2018-05-15 | End: 2019-06-17 | Stop reason: SDUPTHER

## 2018-05-18 RX ORDER — CLOPIDOGREL BISULFATE 75 MG/1
TABLET ORAL
Qty: 90 TAB | Refills: 3 | Status: SHIPPED | OUTPATIENT
Start: 2018-05-18 | End: 2019-05-21 | Stop reason: SDUPTHER

## 2018-08-30 ENCOUNTER — HOSPITAL ENCOUNTER (OUTPATIENT)
Dept: NON INVASIVE DIAGNOSTICS | Age: 78
Discharge: HOME | End: 2018-08-30
Attending: INTERNAL MEDICINE
Payer: MEDICARE

## 2018-08-30 ENCOUNTER — APPOINTMENT (OUTPATIENT)
Dept: NON INVASIVE DIAGNOSTICS | Age: 78
End: 2018-08-30
Attending: INTERNAL MEDICINE
Payer: MEDICARE

## 2018-08-30 VITALS
WEIGHT: 222 LBS | HEIGHT: 73 IN | BODY MASS INDEX: 29.42 KG/M2 | SYSTOLIC BLOOD PRESSURE: 160 MMHG | DIASTOLIC BLOOD PRESSURE: 80 MMHG

## 2018-08-30 DIAGNOSIS — I25.10 ATHEROSCLEROSIS OF NATIVE CORONARY ARTERY OF NATIVE HEART WITHOUT ANGINA PECTORIS: ICD-10-CM

## 2018-08-30 PROCEDURE — 74011250636 HC RX REV CODE- 250/636: Performed by: INTERNAL MEDICINE

## 2018-08-30 PROCEDURE — A9500 TC99M SESTAMIBI: HCPCS

## 2018-08-30 PROCEDURE — 74011000258 HC RX REV CODE- 258: Performed by: INTERNAL MEDICINE

## 2018-08-30 RX ORDER — SODIUM CHLORIDE 0.9 % (FLUSH) 0.9 %
10 SYRINGE (ML) INJECTION AS NEEDED
Status: COMPLETED | OUTPATIENT
Start: 2018-08-30 | End: 2018-08-30

## 2018-08-30 RX ORDER — SODIUM CHLORIDE 9 MG/ML
100 INJECTION, SOLUTION INTRAVENOUS ONCE
Status: COMPLETED | OUTPATIENT
Start: 2018-08-30 | End: 2018-08-30

## 2018-08-30 RX ADMIN — SODIUM CHLORIDE 100 ML/HR: 900 INJECTION, SOLUTION INTRAVENOUS at 09:35

## 2018-08-30 RX ADMIN — REGADENOSON 0.4 MG: 0.08 INJECTION, SOLUTION INTRAVENOUS at 09:35

## 2018-08-30 RX ADMIN — Medication 10 ML: at 07:55

## 2018-08-30 NOTE — PROGRESS NOTES
Patient was injected with 07.4 millicuries 00NHJ Sestamibi on 8/30/18 at 0755. Patient was injected with 90.1 millicuries 18NFZ Sestamibi on 8/30/18 at 0935. Patient's armbands were removed and placed in shred-it box.  
 
Patient had a Nuclear Lexiscan Stress Test.

## 2018-08-31 LAB
NUC REST EJECTION FRACTION: 59 %
STRESS BASELINE DIAS BP: 80 MMHG
STRESS BASELINE HR: 63 BPM
STRESS BASELINE SYS BP: 160 MMHG
STRESS ESTIMATED WORKLOAD: 1 METS
STRESS EXERCISE DUR MIN: NORMAL
STRESS PEAK DIAS BP: 80 MMHG
STRESS PEAK SYS BP: 150 MMHG
STRESS PERCENT HR ACHIEVED: 70 %
STRESS POST PEAK HR: 86 BPM
STRESS RATE PRESSURE PRODUCT: NORMAL BPM*MMHG
STRESS ST DEPRESSION: 0 MM
STRESS ST ELEVATION: 0 MM
STRESS TARGET HR: 122 BPM
TID: 0.91

## 2018-09-06 ENCOUNTER — TELEPHONE (OUTPATIENT)
Dept: CARDIOLOGY CLINIC | Age: 78
End: 2018-09-06

## 2018-09-06 NOTE — TELEPHONE ENCOUNTER
Called patient to advise him nuclear stress test would be reviewed by Dr. Rashi Carpenter when he returns from vacation but preliminarily it looks ok. Left message for patient to call back.

## 2018-10-03 ENCOUNTER — OFFICE VISIT (OUTPATIENT)
Dept: CARDIOLOGY CLINIC | Age: 78
End: 2018-10-03

## 2018-10-03 VITALS
SYSTOLIC BLOOD PRESSURE: 136 MMHG | BODY MASS INDEX: 29.16 KG/M2 | WEIGHT: 220 LBS | HEIGHT: 73 IN | DIASTOLIC BLOOD PRESSURE: 68 MMHG | OXYGEN SATURATION: 96 % | HEART RATE: 63 BPM

## 2018-10-03 DIAGNOSIS — R06.09 DOE (DYSPNEA ON EXERTION): ICD-10-CM

## 2018-10-03 DIAGNOSIS — I11.9 BENIGN HYPERTENSIVE HEART DISEASE WITHOUT HEART FAILURE: ICD-10-CM

## 2018-10-03 DIAGNOSIS — Z90.5 HISTORY OF NEPHRECTOMY: ICD-10-CM

## 2018-10-03 DIAGNOSIS — I51.9 DIASTOLIC DYSFUNCTION, LEFT VENTRICLE: ICD-10-CM

## 2018-10-03 DIAGNOSIS — N28.9 RENAL INSUFFICIENCY: ICD-10-CM

## 2018-10-03 DIAGNOSIS — I82.402 DEEP VEIN THROMBOSIS (DVT) OF LEFT LOWER EXTREMITY, UNSPECIFIED CHRONICITY, UNSPECIFIED VEIN (HCC): ICD-10-CM

## 2018-10-03 DIAGNOSIS — M79.10 MYALGIA: ICD-10-CM

## 2018-10-03 DIAGNOSIS — I25.10 ATHEROSCLEROSIS OF NATIVE CORONARY ARTERY OF NATIVE HEART WITHOUT ANGINA PECTORIS: Primary | ICD-10-CM

## 2018-10-03 DIAGNOSIS — E78.5 DYSLIPIDEMIA: ICD-10-CM

## 2018-10-03 NOTE — MR AVS SNAPSHOT
[address] [place] [address] [place] [id number] 
[phone] Patient: Dariusz Funes MRN: [id number] LXR:[id number][id number] Visit Information Date & Time Provider Department Dept. Phone Encounter #  
 10/3/2018  9:20 AM Adrianna Welsh MD [facility] [address] [id number] Upcoming Health Maintenance Date Due DTaP/Tdap/Td series (1 - Tdap) 5/6/1963 Shingrix Vaccine Age 50> (1 of 2) 5/6/1992 GLAUCOMA SCREENING Q2Y 5/6/2007 Pneumococcal 65+ Low/Medium Risk (1 of 2 - PCV13) 5/6/2007 MEDICARE YEARLY EXAM 3/14/2018 Influenza Age 5 to Adult 8/1/2018 Allergies as of 10/3/2018  Review Complete On: 8/30/2018 By: Tonya Munoz Severity Noted Reaction Type Reactions Lipitor [Atorvastatin]  02/02/2011    Myalgia Morphine  02/02/2011    Other (comments) Low blood pressure Niaspan [Niacin]  04/19/2011    Not Reported This Time Statins-hmg-coa Reductase Inhibitors  04/19/2011    Myalgia Zetia [Ezetimibe]  02/02/2011    Myalgia Current Immunizations  Never Reviewed No immunizations on file. Not reviewed this visit You Were Diagnosed With   
  
 Codes Comments Atherosclerosis of native coronary artery of native heart without angina pectoris    -  Primary ICD-10-CM: I25.10 ICD-9-CM: 414.01 Benign hypertensive heart disease without heart failure     ICD-10-CM: I11.9 ICD-9-CM: 402.10 Dyslipidemia     ICD-10-CM: E78.5 ICD-9-CM: 272.4 Deep vein thrombosis (DVT) of left lower extremity, unspecified chronicity, unspecified vein (HCC)     ICD-10-CM: S01.398 ICD-9-CM: 453.40 History of nephrectomy     ICD-10-CM: Z90.5 ICD-9-CM: V45.73 Renal insufficiency     ICD-10-CM: N28.9 ICD-9-CM: 593.9 Myalgia     ICD-10-CM: M79.10 ICD-9-CM: 729.1  DESAI (dyspnea on exertion)     ICD-10-CM: R06.09 
ICD-9-CM: 786.09   
 Diastolic dysfunction, left ventricle     ICD-10-CM: I51.9 ICD-9-CM: 429.9 Vitals BP Pulse Height(growth percentile) Weight(growth percentile) SpO2 BMI  
 136/68 63 6' 1\" (1.854 m) 220 lb (99.8 kg) 96% 29.03 kg/m2 Smoking Status Former Smoker Vitals History BMI and BSA Data Body Mass Index Body Surface Area  
 29.03 kg/m 2 2.27 m 2 Preferred Pharmacy Pharmacy Name Phone [address] [place] [address] [phone] Your Updated Medication List  
  
   
This list is accurate as of 10/3/18 10:13 AM.  Always use your most recent med list.  
  
  
  
  
 ALBUTEROL IN Take 1 Puff by inhalation as needed. alirocumab 150 mg/mL injector pen Commonly known as:  PRALUENT PEN  
1 mL by SubCUTAneous route Once every 2 weeks. carvedilol 25 mg tablet Commonly known as:  Kev Alejandro Take 1 Tab by mouth two (2) times daily (with meals). clopidogrel 75 mg Tab Commonly known as:  PLAVIX TAKE ONE TABLET BY MOUTH ONCE DAILY FISH OIL 1,000 mg (120 mg-180 mg) capsule Generic drug:  omega 3-DHA-EPA-fish oil Take 2 Caps by mouth two (2) times a day. ramipril 10 mg capsule Commonly known as:  ALTACE Take 1 Cap by mouth two (2) times a day. VITAMIN D2 PO Take  by mouth. We Performed the Following AMB POC EKG ROUTINE W/ 12 LEADS, INTER & REP [03372 CPT(R)] Introducing [facility]! Jersey Kuo introduces [facility] [employer] [facility] patient portal. Now you can access parts of your medical record, email your doctor's office, and request medication refills online. 1. In your internet browser, go to [email or web address] 2. Click on the First Time User? Click Here link in the Sign In box. You will see the New Member Sign Up page. 3. Enter your [facility] [employer] [facility] Access Code exactly as it appears below.  You will not need to use this code after youve completed the sign-up process. If you do not sign up before the expiration date, you must request a new code. · [employer] Access Code: [email or web address] Expires: 1/1/2019 10:13 AM 
 
4. Enter the last four digits of your Social Security Number (xxxx) and Date of Birth (mm/dd/yyyy) as indicated and click Submit. You will be taken to the next sign-up page. 5. Create a [employer] ID. This will be your [employer] login ID and cannot be changed, so think of one that is secure and easy to remember. 6. Create a [employer] password. You can change your password at any time. 7. Enter your Password Reset Question and Answer. This can be used at a later time if you forget your password. 8. Enter your e-mail address. You will receive e-mail notification when new information is available in [address]. 9. Click Sign Up. You can now view and download portions of your medical record. 10. Click the Download Summary menu link to download a portable copy of your medical information. If you have questions, please visit the Frequently Asked Questions section of the [employer] website. Remember, [employer] is NOT to be used for urgent needs. For medical emergencies, dial 911. Now available from your iPhone and Android! Please provide this summary of care documentation to your next provider. Your primary care clinician is listed as [address] If you have any questions after today's visit, please call [phone].

## 2018-10-03 NOTE — PROGRESS NOTES
HISTORY OF PRESENT ILLNESS Florence Fisher is a 70 y.o. male. HPI He has been doing well. He denies any cardiac symptoms other than chronic dyspnea on exertion which has not changed much. He denies chest pain, resting dyspnea, orthopnea, PND. He denies palpitation, dizziness or syncope. He denies any symptoms of TIA or amaurosis fugax. His blood pressure has been under control. His cholesterol profile remains unsatisfactory. He has not been able to tolerate any statin. He was advised to consider PCSK9 inhibitor but so far he has not been interested. He underwent stress nuclear cardiac imaging on 8/30/18 which demonstrated normal perfusion other than small sized fixed defect in the inferior wall which was thought to be related to artifact. EF was calculated at 59%. He has history of coronary artery disease and had cardiac catheterization on May 27, 2005, which demonstrated:   
1. 50% stenosis of the mid-LAD. 2. 50% to 60% tubular stenosis at the ostium of the large 3rd obtuse marginal branch of the circumflex artery. 3. 95% stenosis of the mid right coronary artery. 4. Patent left renal artery. 5. Mildly depressed left ventricular systolic function, with EF in the 50% range, with no significant regional wall motion abnormalities. Subsequent stenting of the right coronary artery with a 3.5 x 18-mm Cypher stent was successful. The right ventricular marginal branch was jailed with stenosis and was balloon angioplastied, with 30% residual stenosis. He has had no recurrent angina since then. He has history of asthma and had some wheezing in the past, which has been stable since he has stopped smoking a pipe. He has history of a DVT of the left lower extremity in August 2003 and had been on Coumadin until May 2005.  He underwent an adenosine Cardiolite myocardial perfusion scan on July 5, 2007, which demonstrated small scarring in the inferior, inferior septum, and inferior apex with no significant ischemia, which could be secondary to diaphragmatic attenuation since there were no wall motion abnormalities on gated SPECT imaging. LV function was normal with EF in the 66% range. He had stress nuclear cardiac imaging as a Lexiscan on 5/17/11, which demonstrated a very small, fixed defect in the apex, most likely secondary to apical thinning, and mild, fixed defect in the inferior wall, most likely secondary to diaphragmatic attenuation. The LV function was normal, with EF in the 67% range. He had follow up stress nuclear cardiac imaging on 3/12/14, which demonstrated moderate sized mild fixed perfusion defect in the basal inferior, mid inferior, and inferoapical wall, which was felt to be related to diaphragmatic attenuation, essentially unchanged. The ejection fraction was in the 62% range. He was a pipe smoker until 2003 and he also had a great deal of exposure to secondhand smoke from his college roommate. He underwent stress nuclear cardiac imaging on 06/01/16 as pharmacological nuclear cardiac imaging, which demonstrated normal perfusion with no evidence of scarring or ischemia. Ejection fraction was in the 68% range. Review of Systems Constitutional: Negative for malaise/fatigue and weight loss. HENT: Negative for hearing loss. Eyes: Negative for blurred vision and double vision. Respiratory: Positive for shortness of breath. Cardiovascular: Negative for chest pain, palpitations, orthopnea, claudication, leg swelling and PND. Gastrointestinal: Negative for blood in stool, heartburn and melena. Genitourinary: Positive for frequency. Negative for dysuria, hematuria and urgency. Musculoskeletal: Positive for joint pain. Negative for back pain. Skin: Negative for itching and rash. Neurological: Negative for dizziness, loss of consciousness and weakness. Psychiatric/Behavioral: Negative for depression and memory loss.   
 
 
Physical Exam  
 Constitutional: He is oriented to person, place, and time. He appears well-developed and well-nourished. HENT:  
Head: Normocephalic and atraumatic. Eyes: Conjunctivae are normal. Pupils are equal, round, and reactive to light. Neck: Normal range of motion. Neck supple. No JVD present. Cardiovascular: Normal rate, regular rhythm, S1 normal and S2 normal.   No extrasystoles are present. PMI is not displaced. Exam reveals no gallop and no friction rub. No murmur heard. Pulses: 
     Carotid pulses are 3+ on the right side, and 3+ on the left side. Pulmonary/Chest: Effort normal. He has no wheezes. He has no rales. Abdominal: Soft. There is no tenderness. Musculoskeletal: He exhibits no edema. Neurological: He is alert and oriented to person, place, and time. No cranial nerve deficit. Skin: Skin is warm and dry. Psychiatric: He has a normal mood and affect. His behavior is normal.  
 
Visit Vitals  /68  Pulse 63  Ht 6' 1\" (1.854 m)  Wt 99.8 kg (220 lb)  SpO2 96%  BMI 29.03 kg/m2 Past Medical History:  
Diagnosis Date  Asthma  Cardiac catheterization 05/27/2005 LM patent. mLAD 50%. Cx mild. OM3 55%. mRCA 95% (Cypher 3.5 x 18 DAJA). EF 50%. Patent left renal.  
 Cardiac Holter monitor, normal 06/21/2010 Benign Holter study.  Cardiac nuclear imaging test, low risk 06/01/2016 Low risk. No ischemia or prior infarction. No WMA. EF 68%. Neg EKG on pharm stress test.  
 Coronary artery disease May 2005  
 status post stenting of the right coronary artery  DVT (deep venous thrombosis) [facility]) August 2003  
 left lower extremity  Dyslipidemia  History of nephrectomy 1998  
 right, secondary to cancer  History of vasectomy  Hypertension  Renal insufficiency   
 mild, with a creatinine of 1.6 prior to cardiac catheterization in May 2005 Social History Social History  Marital status:    Spouse name: N/A  
  Number of children: N/A  
 Years of education: N/A Occupational History  Not on file. Social History Main Topics  Smoking status: Former Smoker Years: 40.00 Types: Pipe Quit date: 2008  Smokeless tobacco: Never Used  Alcohol use No  
 Drug use: No  
 Sexual activity: Not on file Other Topics Concern  Not on file Social History Narrative Family History Problem Relation Age of Onset  Heart Attack Mother  at age 79 from MI. She also had enlarged heart and asthma.  Hypertension Mother  Cancer Father Liver cancer Past Surgical History:  
Procedure Laterality Date  HX CORONARY STENT PLACEMENT  05 RCA with 3.5 x 18-mm Cypher stent  HX HEART CATHETERIZATION  05 [address]  
 right  HX VASECTOMY Current Outpatient Prescriptions Medication Sig Dispense Refill  clopidogrel (PLAVIX) 75 mg tab TAKE ONE TABLET BY MOUTH ONCE DAILY 90 Tab 3  
 ramipril (ALTACE) 10 mg capsule Take 1 Cap by mouth two (2) times a day. 180 Cap 3  carvedilol (COREG) 25 mg tablet Take 1 Tab by mouth two (2) times daily (with meals). 180 Tab 3  
 ERGOCALCIFEROL, VITAMIN D2, (VITAMIN D2 PO) Take  by mouth.  Omega-3-DHA-EPA-Fish Oil (FISH OIL) 1,000 (120-180) mg Cap Take 2 Caps by mouth two (2) times a day.  ALBUTEROL IN Take 1 Puff by inhalation as needed.  alirocumab (PRALUENT PEN) 150 mg/mL injector pen 1 mL by SubCUTAneous route Once every 2 weeks. (Patient taking differently: 150 mg by SubCUTAneous route Once every 2 weeks. Indications: Patient decided not to take this med) 2 Syringe 3 EKG: normal EKG, normal sinus rhythm, unchanged from previous tracings, upper limit FL Mitchell Correia ASSESSMENT and PLAN Encounter Diagnoses Name Primary?  Atherosclerosis of native coronary artery of native heart without angina pectoris,status post stenting of the RCA in May 2005/EF 50%. Yes  Hypertension  Dyslipidemia  Deep vein thrombosis (DVT) of left lower extremity, unspecified chronicity, unspecified vein (HCC)  History of right nephrectomy due to renal cell carcinoma in 1998.  Mild renal insufficiency  Myalgia, related to statin.  DESAI (dyspnea on exertion)  Diastolic dysfunction, left ventricle He has been doing very well from a cardiac standpoint. He has had no symptoms to indicate angina or cardiac decompensation. His recent stress nuclear cardiac imaging study demonstrated no ischemia and normal LV function. His blood pressure has been under control now. His cholesterol profile is very unsatisfactory, however, he is not interested or willing to consider PCSK9 inhibitor. He has not been able to tolerate statin at all. At this point, he will be continued on current regimen.

## 2018-10-03 NOTE — PROGRESS NOTES
Shirlene Kurtz presents today for Chief Complaint Patient presents with  Coronary Artery Disease 6 month follow up  Shortness of Breath  
  exertional   
 
 
Shirlene Kurtz preferred language for health care discussion is english/other. Is someone accompanying this pt? No  
 
Is the patient using any DME equipment during OV? No  
 
Depression Screening: PHQ over the last two weeks 3/17/2014 Little interest or pleasure in doing things Not at all Feeling down, depressed, irritable, or hopeless Not at all Total Score PHQ 2 0 Learning Assessment: 
Learning Assessment 3/7/2018 PRIMARY LEARNER Patient HIGHEST LEVEL OF EDUCATION - PRIMARY LEARNER  -  
BARRIERS PRIMARY LEARNER -  
CO-LEARNER CAREGIVER -  
PRIMARY LANGUAGE ENGLISH  NEED -  
LEARNER PREFERENCE PRIMARY DEMONSTRATION  
ANSWERED BY Patient RELATIONSHIP SELF Abuse Screening: No flowsheet data found. Fall Risk Fall Risk Assessment, last 12 mths 3/17/2014 Able to walk? Yes Fall in past 12 months? No  
 
 
Pt currently taking Anticoagulant therapy? Plavix Coordination of Care: 1. Have you been to the ER, urgent care clinic since your last visit? Hospitalized since your last visit? No  
 
2. Have you seen or consulted any other health care providers outside of the [address] [place] [address] since your last visit? Include any pap smears or colon screening.  No

## 2018-10-08 ENCOUNTER — DOCUMENTATION ONLY (OUTPATIENT)
Dept: CARDIOLOGY CLINIC | Age: 78
End: 2018-10-08

## 2019-01-01 ENCOUNTER — HOSPITAL ENCOUNTER (OUTPATIENT)
Dept: GENERAL RADIOLOGY | Age: 79
Discharge: HOME | End: 2019-10-03
Payer: MEDICARE

## 2019-01-01 ENCOUNTER — OFFICE VISIT (OUTPATIENT)
Dept: CARDIOLOGY CLINIC | Age: 79
End: 2019-01-01

## 2019-01-01 ENCOUNTER — TELEPHONE (OUTPATIENT)
Dept: CARDIOLOGY CLINIC | Age: 79
End: 2019-01-01

## 2019-01-01 ENCOUNTER — TELEPHONE (OUTPATIENT)
Dept: CARDIAC REHAB | Age: 79
End: 2019-01-01

## 2019-01-01 ENCOUNTER — HOSPITAL ENCOUNTER (EMERGENCY)
Age: 79
Discharge: HOME | End: 2019-12-16
Attending: EMERGENCY MEDICINE
Payer: MEDICARE

## 2019-01-01 ENCOUNTER — HOSPITAL ENCOUNTER (OUTPATIENT)
Age: 79
Discharge: HOME | End: 2019-10-08
Attending: INTERNAL MEDICINE | Admitting: INTERNAL MEDICINE
Payer: MEDICARE

## 2019-01-01 ENCOUNTER — APPOINTMENT (OUTPATIENT)
Dept: GENERAL RADIOLOGY | Age: 79
End: 2019-01-01
Attending: PHYSICIAN ASSISTANT
Payer: MEDICARE

## 2019-01-01 ENCOUNTER — OFFICE VISIT (OUTPATIENT)
Dept: ORTHOPEDIC SURGERY | Facility: CLINIC | Age: 79
End: 2019-01-01

## 2019-01-01 ENCOUNTER — HOSPITAL ENCOUNTER (OUTPATIENT)
Dept: LAB | Age: 79
Discharge: HOME | End: 2019-10-03
Payer: MEDICARE

## 2019-01-01 VITALS
SYSTOLIC BLOOD PRESSURE: 110 MMHG | HEIGHT: 73 IN | BODY MASS INDEX: 25.76 KG/M2 | TEMPERATURE: 96.8 F | WEIGHT: 194.4 LBS | OXYGEN SATURATION: 98 % | HEART RATE: 94 BPM | RESPIRATION RATE: 20 BRPM | DIASTOLIC BLOOD PRESSURE: 53 MMHG

## 2019-01-01 VITALS
SYSTOLIC BLOOD PRESSURE: 141 MMHG | DIASTOLIC BLOOD PRESSURE: 75 MMHG | BODY MASS INDEX: 25.18 KG/M2 | OXYGEN SATURATION: 98 % | HEIGHT: 73 IN | HEART RATE: 94 BPM | WEIGHT: 190 LBS | TEMPERATURE: 98.4 F | RESPIRATION RATE: 20 BRPM

## 2019-01-01 VITALS
HEART RATE: 83 BPM | DIASTOLIC BLOOD PRESSURE: 56 MMHG | HEIGHT: 73 IN | TEMPERATURE: 97.5 F | SYSTOLIC BLOOD PRESSURE: 114 MMHG | BODY MASS INDEX: 26.9 KG/M2 | OXYGEN SATURATION: 96 % | RESPIRATION RATE: 16 BRPM | WEIGHT: 203 LBS

## 2019-01-01 VITALS
HEART RATE: 60 BPM | HEIGHT: 73 IN | OXYGEN SATURATION: 98 % | SYSTOLIC BLOOD PRESSURE: 126 MMHG | DIASTOLIC BLOOD PRESSURE: 64 MMHG | BODY MASS INDEX: 27.83 KG/M2 | WEIGHT: 210 LBS

## 2019-01-01 VITALS
WEIGHT: 200 LBS | DIASTOLIC BLOOD PRESSURE: 70 MMHG | HEIGHT: 73 IN | HEART RATE: 78 BPM | SYSTOLIC BLOOD PRESSURE: 120 MMHG | OXYGEN SATURATION: 98 % | BODY MASS INDEX: 26.51 KG/M2

## 2019-01-01 VITALS
HEART RATE: 81 BPM | TEMPERATURE: 96.7 F | OXYGEN SATURATION: 99 % | RESPIRATION RATE: 18 BRPM | WEIGHT: 205.2 LBS | HEIGHT: 73 IN | SYSTOLIC BLOOD PRESSURE: 146 MMHG | DIASTOLIC BLOOD PRESSURE: 71 MMHG | BODY MASS INDEX: 27.2 KG/M2

## 2019-01-01 DIAGNOSIS — I51.9 DIASTOLIC DYSFUNCTION, LEFT VENTRICLE: ICD-10-CM

## 2019-01-01 DIAGNOSIS — I11.9 BENIGN HYPERTENSIVE HEART DISEASE WITHOUT HEART FAILURE: ICD-10-CM

## 2019-01-01 DIAGNOSIS — L40.50 PSORIATIC ARTHRITIS (HCC): ICD-10-CM

## 2019-01-01 DIAGNOSIS — R07.89 ATYPICAL CHEST PAIN: ICD-10-CM

## 2019-01-01 DIAGNOSIS — R06.09 DOE (DYSPNEA ON EXERTION): ICD-10-CM

## 2019-01-01 DIAGNOSIS — I25.10 ATHEROSCLEROSIS OF NATIVE CORONARY ARTERY OF NATIVE HEART WITHOUT ANGINA PECTORIS: ICD-10-CM

## 2019-01-01 DIAGNOSIS — M17.12 PRIMARY OSTEOARTHRITIS OF LEFT KNEE: Primary | ICD-10-CM

## 2019-01-01 DIAGNOSIS — E78.5 DYSLIPIDEMIA: ICD-10-CM

## 2019-01-01 DIAGNOSIS — M79.10 MYALGIA: ICD-10-CM

## 2019-01-01 DIAGNOSIS — Z90.5 HISTORY OF NEPHRECTOMY: ICD-10-CM

## 2019-01-01 DIAGNOSIS — M25.562 CHRONIC PAIN OF LEFT KNEE: ICD-10-CM

## 2019-01-01 DIAGNOSIS — M77.02 MEDIAL EPICONDYLITIS OF BOTH ELBOWS: ICD-10-CM

## 2019-01-01 DIAGNOSIS — I25.10 ATHEROSCLEROSIS OF NATIVE CORONARY ARTERY OF NATIVE HEART WITHOUT ANGINA PECTORIS: Primary | ICD-10-CM

## 2019-01-01 DIAGNOSIS — G89.29 CHRONIC PAIN OF LEFT KNEE: ICD-10-CM

## 2019-01-01 DIAGNOSIS — I20.8 EXERTIONAL ANGINA (HCC): Primary | ICD-10-CM

## 2019-01-01 DIAGNOSIS — M77.01 MEDIAL EPICONDYLITIS OF BOTH ELBOWS: ICD-10-CM

## 2019-01-01 DIAGNOSIS — N28.9 RENAL INSUFFICIENCY: ICD-10-CM

## 2019-01-01 DIAGNOSIS — I82.402 DEEP VEIN THROMBOSIS (DVT) OF LEFT LOWER EXTREMITY, UNSPECIFIED CHRONICITY, UNSPECIFIED VEIN (HCC): ICD-10-CM

## 2019-01-01 DIAGNOSIS — I20.8 ANGINA DECUBITUS (HCC): ICD-10-CM

## 2019-01-01 DIAGNOSIS — J20.9 ACUTE BRONCHITIS, UNSPECIFIED ORGANISM: Primary | ICD-10-CM

## 2019-01-01 DIAGNOSIS — I25.10 CORONARY ATHEROSCLEROSIS OF NATIVE CORONARY ARTERY: ICD-10-CM

## 2019-01-01 LAB
ALBUMIN SERPL-MCNC: 3.4 G/DL (ref 3.4–5)
ALBUMIN/GLOB SERPL: 1 {RATIO} (ref 0.8–1.7)
ALP SERPL-CCNC: 87 U/L (ref 45–117)
ALT SERPL-CCNC: 13 U/L (ref 16–61)
ANION GAP SERPL CALC-SCNC: 4 MMOL/L (ref 3–18)
ANION GAP SERPL CALC-SCNC: 9 MMOL/L (ref 3–18)
APPEARANCE UR: CLEAR
AST SERPL-CCNC: 9 U/L (ref 10–38)
ATRIAL RATE: 77 BPM
ATRIAL RATE: 96 BPM
BASOPHILS # BLD: 0 K/UL (ref 0–0.1)
BASOPHILS # BLD: 0 K/UL (ref 0–0.1)
BASOPHILS NFR BLD: 0 % (ref 0–2)
BASOPHILS NFR BLD: 0 % (ref 0–2)
BILIRUB SERPL-MCNC: 0.5 MG/DL (ref 0.2–1)
BILIRUB UR QL: NEGATIVE
BNP SERPL-MCNC: 628 PG/ML (ref 0–1800)
BUN SERPL-MCNC: 21 MG/DL (ref 7–18)
BUN SERPL-MCNC: 33 MG/DL (ref 7–18)
BUN/CREAT SERPL: 15 (ref 12–20)
BUN/CREAT SERPL: 20 (ref 12–20)
CALCIUM SERPL-MCNC: 9 MG/DL (ref 8.5–10.1)
CALCIUM SERPL-MCNC: 9.1 MG/DL (ref 8.5–10.1)
CALCULATED P AXIS, ECG09: 76 DEGREES
CALCULATED P AXIS, ECG09: 77 DEGREES
CALCULATED R AXIS, ECG10: 78 DEGREES
CALCULATED R AXIS, ECG10: 85 DEGREES
CALCULATED T AXIS, ECG11: 63 DEGREES
CALCULATED T AXIS, ECG11: 71 DEGREES
CHLORIDE SERPL-SCNC: 104 MMOL/L (ref 100–111)
CHLORIDE SERPL-SCNC: 107 MMOL/L (ref 100–111)
CK MB CFR SERPL CALC: 1.4 % (ref 0–4)
CK MB SERPL-MCNC: 1.3 NG/ML (ref 5–25)
CK SERPL-CCNC: 92 U/L (ref 39–308)
CO2 SERPL-SCNC: 23 MMOL/L (ref 21–32)
CO2 SERPL-SCNC: 28 MMOL/L (ref 21–32)
COLOR UR: YELLOW
CREAT SERPL-MCNC: 1.39 MG/DL (ref 0.6–1.3)
CREAT SERPL-MCNC: 1.64 MG/DL (ref 0.6–1.3)
DIAGNOSIS, 93000: NORMAL
DIAGNOSIS, 93000: NORMAL
DIFFERENTIAL METHOD BLD: ABNORMAL
DIFFERENTIAL METHOD BLD: ABNORMAL
EOSINOPHIL # BLD: 0.2 K/UL (ref 0–0.4)
EOSINOPHIL # BLD: 0.3 K/UL (ref 0–0.4)
EOSINOPHIL NFR BLD: 3 % (ref 0–5)
EOSINOPHIL NFR BLD: 6 % (ref 0–5)
EPITH CASTS URNS QL MICRO: NORMAL /LPF (ref 0–5)
ERYTHROCYTE [DISTWIDTH] IN BLOOD BY AUTOMATED COUNT: 14.3 % (ref 11.6–14.5)
ERYTHROCYTE [DISTWIDTH] IN BLOOD BY AUTOMATED COUNT: 14.4 % (ref 11.6–14.5)
GLOBULIN SER CALC-MCNC: 3.3 G/DL (ref 2–4)
GLUCOSE BLD STRIP.AUTO-MCNC: 170 MG/DL (ref 70–110)
GLUCOSE SERPL-MCNC: 158 MG/DL (ref 74–99)
GLUCOSE SERPL-MCNC: 275 MG/DL (ref 74–99)
GLUCOSE UR STRIP.AUTO-MCNC: 500 MG/DL
HCT VFR BLD AUTO: 31.8 % (ref 36–48)
HCT VFR BLD AUTO: 31.9 % (ref 36–48)
HGB BLD-MCNC: 10.2 G/DL (ref 13–16)
HGB BLD-MCNC: 10.3 G/DL (ref 13–16)
HGB UR QL STRIP: ABNORMAL
INR PPP: 1.1 (ref 0.8–1.2)
KETONES UR QL STRIP.AUTO: NEGATIVE MG/DL
LEUKOCYTE ESTERASE UR QL STRIP.AUTO: NEGATIVE
LYMPHOCYTES # BLD: 0.8 K/UL (ref 0.9–3.6)
LYMPHOCYTES # BLD: 0.8 K/UL (ref 0.9–3.6)
LYMPHOCYTES NFR BLD: 11 % (ref 21–52)
LYMPHOCYTES NFR BLD: 17 % (ref 21–52)
MCH RBC QN AUTO: 26.1 PG (ref 24–34)
MCH RBC QN AUTO: 26.2 PG (ref 24–34)
MCHC RBC AUTO-ENTMCNC: 32 G/DL (ref 31–37)
MCHC RBC AUTO-ENTMCNC: 32.4 G/DL (ref 31–37)
MCV RBC AUTO: 80.7 FL (ref 74–97)
MCV RBC AUTO: 82 FL (ref 74–97)
MONOCYTES # BLD: 0.4 K/UL (ref 0.05–1.2)
MONOCYTES # BLD: 0.5 K/UL (ref 0.05–1.2)
MONOCYTES NFR BLD: 6 % (ref 3–10)
MONOCYTES NFR BLD: 8 % (ref 3–10)
NEUTS SEG # BLD: 3.4 K/UL (ref 1.8–8)
NEUTS SEG # BLD: 6.2 K/UL (ref 1.8–8)
NEUTS SEG NFR BLD: 69 % (ref 40–73)
NEUTS SEG NFR BLD: 80 % (ref 40–73)
NITRITE UR QL STRIP.AUTO: NEGATIVE
P-R INTERVAL, ECG05: 208 MS
P-R INTERVAL, ECG05: 228 MS
PH UR STRIP: 5.5 [PH] (ref 5–8)
PLATELET # BLD AUTO: 115 K/UL (ref 135–420)
PLATELET # BLD AUTO: 229 K/UL (ref 135–420)
PMV BLD AUTO: 10 FL (ref 9.2–11.8)
PMV BLD AUTO: 9.2 FL (ref 9.2–11.8)
POTASSIUM SERPL-SCNC: 4.9 MMOL/L (ref 3.5–5.5)
POTASSIUM SERPL-SCNC: 5.2 MMOL/L (ref 3.5–5.5)
PROT SERPL-MCNC: 6.7 G/DL (ref 6.4–8.2)
PROT UR STRIP-MCNC: 300 MG/DL
PROTHROMBIN TIME: 13.9 SEC (ref 11.5–15.2)
Q-T INTERVAL, ECG07: 320 MS
Q-T INTERVAL, ECG07: 362 MS
QRS DURATION, ECG06: 94 MS
QRS DURATION, ECG06: 96 MS
QTC CALCULATION (BEZET), ECG08: 404 MS
QTC CALCULATION (BEZET), ECG08: 409 MS
RBC # BLD AUTO: 3.89 M/UL (ref 4.7–5.5)
RBC # BLD AUTO: 3.94 M/UL (ref 4.7–5.5)
RBC #/AREA URNS HPF: NORMAL /HPF (ref 0–5)
SODIUM SERPL-SCNC: 136 MMOL/L (ref 136–145)
SODIUM SERPL-SCNC: 139 MMOL/L (ref 136–145)
SP GR UR REFRACTOMETRY: 1.02 (ref 1–1.03)
TROPONIN I SERPL-MCNC: <0.02 NG/ML (ref 0–0.04)
TROPONIN I SERPL-MCNC: <0.02 NG/ML (ref 0–0.04)
UROBILINOGEN UR QL STRIP.AUTO: 1 EU/DL (ref 0.2–1)
VENTRICULAR RATE, ECG03: 77 BPM
VENTRICULAR RATE, ECG03: 96 BPM
WBC # BLD AUTO: 4.9 K/UL (ref 4.6–13.2)
WBC # BLD AUTO: 7.7 K/UL (ref 4.6–13.2)
WBC URNS QL MICRO: NORMAL /HPF (ref 0–4)

## 2019-01-01 PROCEDURE — 77030015766: Performed by: INTERNAL MEDICINE

## 2019-01-01 PROCEDURE — 74011250637 HC RX REV CODE- 250/637

## 2019-01-01 PROCEDURE — 74011250636 HC RX REV CODE- 250/636: Performed by: INTERNAL MEDICINE

## 2019-01-01 PROCEDURE — 77030013797 HC KT TRNSDUC PRSSR EDWD -A: Performed by: INTERNAL MEDICINE

## 2019-01-01 PROCEDURE — 80048 BASIC METABOLIC PNL TOTAL CA: CPT

## 2019-01-01 PROCEDURE — 74011000258 HC RX REV CODE- 258: Performed by: INTERNAL MEDICINE

## 2019-01-01 PROCEDURE — 93005 ELECTROCARDIOGRAM TRACING: CPT

## 2019-01-01 PROCEDURE — 77030027845 HC BND COM RDL D-STAT TELE -B: Performed by: INTERNAL MEDICINE

## 2019-01-01 PROCEDURE — C1725 CATH, TRANSLUMIN NON-LASER: HCPCS | Performed by: INTERNAL MEDICINE

## 2019-01-01 PROCEDURE — C1769 GUIDE WIRE: HCPCS | Performed by: INTERNAL MEDICINE

## 2019-01-01 PROCEDURE — 71046 X-RAY EXAM CHEST 2 VIEWS: CPT

## 2019-01-01 PROCEDURE — 77030028790 HC ACC ST CTRL VLV COPLT ABBT -B: Performed by: INTERNAL MEDICINE

## 2019-01-01 PROCEDURE — 82550 ASSAY OF CK (CPK): CPT

## 2019-01-01 PROCEDURE — 94640 AIRWAY INHALATION TREATMENT: CPT

## 2019-01-01 PROCEDURE — 74011250636 HC RX REV CODE- 250/636

## 2019-01-01 PROCEDURE — 92928 PRQ TCAT PLMT NTRAC ST 1 LES: CPT | Performed by: INTERNAL MEDICINE

## 2019-01-01 PROCEDURE — 80053 COMPREHEN METABOLIC PANEL: CPT

## 2019-01-01 PROCEDURE — 81001 URINALYSIS AUTO W/SCOPE: CPT

## 2019-01-01 PROCEDURE — 74011000250 HC RX REV CODE- 250: Performed by: INTERNAL MEDICINE

## 2019-01-01 PROCEDURE — 74011000250 HC RX REV CODE- 250: Performed by: EMERGENCY MEDICINE

## 2019-01-01 PROCEDURE — 85025 COMPLETE CBC W/AUTO DIFF WBC: CPT

## 2019-01-01 PROCEDURE — 36415 COLL VENOUS BLD VENIPUNCTURE: CPT

## 2019-01-01 PROCEDURE — 74011250637 HC RX REV CODE- 250/637: Performed by: PHYSICIAN ASSISTANT

## 2019-01-01 PROCEDURE — C1887 CATHETER, GUIDING: HCPCS | Performed by: INTERNAL MEDICINE

## 2019-01-01 PROCEDURE — 99218 HC RM OBSERVATION: CPT

## 2019-01-01 PROCEDURE — 83880 ASSAY OF NATRIURETIC PEPTIDE: CPT

## 2019-01-01 PROCEDURE — 82962 GLUCOSE BLOOD TEST: CPT

## 2019-01-01 PROCEDURE — 74011250637 HC RX REV CODE- 250/637: Performed by: INTERNAL MEDICINE

## 2019-01-01 PROCEDURE — 93458 L HRT ARTERY/VENTRICLE ANGIO: CPT | Performed by: INTERNAL MEDICINE

## 2019-01-01 PROCEDURE — 74011636320 HC RX REV CODE- 636/320: Performed by: INTERNAL MEDICINE

## 2019-01-01 PROCEDURE — C1894 INTRO/SHEATH, NON-LASER: HCPCS | Performed by: INTERNAL MEDICINE

## 2019-01-01 PROCEDURE — 77030013519 HC DEV INFL BASIX MRTM -B: Performed by: INTERNAL MEDICINE

## 2019-01-01 PROCEDURE — C1874 STENT, COATED/COV W/DEL SYS: HCPCS | Performed by: INTERNAL MEDICINE

## 2019-01-01 PROCEDURE — 99285 EMERGENCY DEPT VISIT HI MDM: CPT

## 2019-01-01 PROCEDURE — 85610 PROTHROMBIN TIME: CPT

## 2019-01-01 PROCEDURE — 99153 MOD SED SAME PHYS/QHP EA: CPT | Performed by: INTERNAL MEDICINE

## 2019-01-01 PROCEDURE — 99152 MOD SED SAME PHYS/QHP 5/>YRS: CPT | Performed by: INTERNAL MEDICINE

## 2019-01-01 DEVICE — XIENCE SIERRA™ EVEROLIMUS ELUTING CORONARY STENT SYSTEM 2.75 MM X 38 MM / RAPID-EXCHANGE
Type: IMPLANTABLE DEVICE | Status: FUNCTIONAL
Brand: XIENCE SIERRA™

## 2019-01-01 RX ORDER — HYDRALAZINE HYDROCHLORIDE 25 MG/1
25 TABLET, FILM COATED ORAL
Status: DISCONTINUED | OUTPATIENT
Start: 2019-01-01 | End: 2019-01-01 | Stop reason: HOSPADM

## 2019-01-01 RX ORDER — NITROGLYCERIN 400 UG/1
1 SPRAY ORAL
Qty: 1 BOTTLE | Refills: 2 | Status: SHIPPED | OUTPATIENT
Start: 2019-01-01

## 2019-01-01 RX ORDER — GUAIFENESIN 100 MG/5ML
LIQUID (ML) ORAL
Status: COMPLETED
Start: 2019-01-01 | End: 2019-01-01

## 2019-01-01 RX ORDER — COLCHICINE 0.6 MG/1
TABLET ORAL
Refills: 2 | COMMUNITY
Start: 2019-01-01

## 2019-01-01 RX ORDER — LIDOCAINE HYDROCHLORIDE 10 MG/ML
INJECTION, SOLUTION EPIDURAL; INFILTRATION; INTRACAUDAL; PERINEURAL AS NEEDED
Status: DISCONTINUED | OUTPATIENT
Start: 2019-01-01 | End: 2019-01-01 | Stop reason: HOSPADM

## 2019-01-01 RX ORDER — TRIAMCINOLONE ACETONIDE 1 MG/G
CREAM TOPICAL
Refills: 3 | COMMUNITY
Start: 2019-01-01

## 2019-01-01 RX ORDER — ALBUTEROL SULFATE 0.83 MG/ML
2.5 SOLUTION RESPIRATORY (INHALATION)
Status: COMPLETED | OUTPATIENT
Start: 2019-01-01 | End: 2019-01-01

## 2019-01-01 RX ORDER — ISOSORBIDE MONONITRATE 30 MG/1
30 TABLET, EXTENDED RELEASE ORAL
Qty: 30 TAB | Refills: 6 | Status: SHIPPED | OUTPATIENT
Start: 2019-01-01 | End: 2019-01-01

## 2019-01-01 RX ORDER — NITROGLYCERIN 400 UG/1
1 SPRAY ORAL
Status: DISCONTINUED | OUTPATIENT
Start: 2019-01-01 | End: 2019-01-01 | Stop reason: HOSPADM

## 2019-01-01 RX ORDER — HYDRALAZINE HYDROCHLORIDE 25 MG/1
25 TABLET, FILM COATED ORAL 3 TIMES DAILY
Status: DISCONTINUED | OUTPATIENT
Start: 2019-01-01 | End: 2019-01-01

## 2019-01-01 RX ORDER — CLOPIDOGREL BISULFATE 75 MG/1
75 TABLET ORAL DAILY
Status: DISCONTINUED | OUTPATIENT
Start: 2019-01-01 | End: 2019-01-01 | Stop reason: HOSPADM

## 2019-01-01 RX ORDER — HYDRALAZINE HYDROCHLORIDE 25 MG/1
25 TABLET, FILM COATED ORAL DAILY
Status: DISCONTINUED | OUTPATIENT
Start: 2019-01-01 | End: 2019-01-01

## 2019-01-01 RX ORDER — HYDRALAZINE HYDROCHLORIDE 20 MG/ML
10 INJECTION INTRAMUSCULAR; INTRAVENOUS ONCE
Status: COMPLETED | OUTPATIENT
Start: 2019-01-01 | End: 2019-01-01

## 2019-01-01 RX ORDER — FENTANYL CITRATE 50 UG/ML
INJECTION, SOLUTION INTRAMUSCULAR; INTRAVENOUS AS NEEDED
Status: DISCONTINUED | OUTPATIENT
Start: 2019-01-01 | End: 2019-01-01 | Stop reason: HOSPADM

## 2019-01-01 RX ORDER — HYDRALAZINE HYDROCHLORIDE 20 MG/ML
INJECTION INTRAMUSCULAR; INTRAVENOUS
Status: COMPLETED
Start: 2019-01-01 | End: 2019-01-01

## 2019-01-01 RX ORDER — ALBUTEROL SULFATE 90 UG/1
2 AEROSOL, METERED RESPIRATORY (INHALATION)
Qty: 1 INHALER | Refills: 0 | Status: SHIPPED | OUTPATIENT
Start: 2019-01-01

## 2019-01-01 RX ORDER — RAMIPRIL 10 MG/1
10 CAPSULE ORAL DAILY
Status: DISCONTINUED | OUTPATIENT
Start: 2019-01-01 | End: 2019-01-01 | Stop reason: HOSPADM

## 2019-01-01 RX ORDER — ASPIRIN 81 MG/1
81 TABLET ORAL DAILY
Qty: 30 TAB | Refills: 6 | Status: SHIPPED | OUTPATIENT
Start: 2019-01-01

## 2019-01-01 RX ORDER — BETAMETHASONE SODIUM PHOSPHATE AND BETAMETHASONE ACETATE 3; 3 MG/ML; MG/ML
6 INJECTION, SUSPENSION INTRA-ARTICULAR; INTRALESIONAL; INTRAMUSCULAR; SOFT TISSUE ONCE
Qty: 0.5 ML | Refills: 0
Start: 2019-01-01 | End: 2019-01-01

## 2019-01-01 RX ORDER — GUAIFENESIN 100 MG/5ML
81 LIQUID (ML) ORAL
Status: COMPLETED | OUTPATIENT
Start: 2019-01-01 | End: 2019-01-01

## 2019-01-01 RX ORDER — CARVEDILOL 25 MG/1
25 TABLET ORAL 2 TIMES DAILY WITH MEALS
Status: DISCONTINUED | OUTPATIENT
Start: 2019-01-01 | End: 2019-01-01 | Stop reason: HOSPADM

## 2019-01-01 RX ORDER — MIDAZOLAM HYDROCHLORIDE 1 MG/ML
INJECTION, SOLUTION INTRAMUSCULAR; INTRAVENOUS AS NEEDED
Status: DISCONTINUED | OUTPATIENT
Start: 2019-01-01 | End: 2019-01-01 | Stop reason: HOSPADM

## 2019-01-01 RX ORDER — HYDRALAZINE HYDROCHLORIDE 25 MG/1
25 TABLET, FILM COATED ORAL 3 TIMES DAILY
Qty: 90 TAB | Refills: 6 | Status: SHIPPED | OUTPATIENT
Start: 2019-01-01

## 2019-01-01 RX ORDER — SODIUM CHLORIDE 450 MG/100ML
150 INJECTION, SOLUTION INTRAVENOUS CONTINUOUS
Status: DISPENSED | OUTPATIENT
Start: 2019-01-01 | End: 2019-01-01

## 2019-01-01 RX ORDER — HYDRALAZINE HYDROCHLORIDE 25 MG/1
25 TABLET, FILM COATED ORAL DAILY
Qty: 90 TAB | Refills: 6 | Status: SHIPPED | OUTPATIENT
Start: 2019-01-01 | End: 2019-01-01

## 2019-01-01 RX ORDER — GUAIFENESIN 100 MG/5ML
81 LIQUID (ML) ORAL DAILY
Status: DISCONTINUED | OUTPATIENT
Start: 2019-01-01 | End: 2019-01-01

## 2019-01-01 RX ORDER — HEPARIN SODIUM 1000 [USP'U]/ML
INJECTION, SOLUTION INTRAVENOUS; SUBCUTANEOUS AS NEEDED
Status: DISCONTINUED | OUTPATIENT
Start: 2019-01-01 | End: 2019-01-01 | Stop reason: HOSPADM

## 2019-01-01 RX ORDER — VERAPAMIL HYDROCHLORIDE 2.5 MG/ML
INJECTION, SOLUTION INTRAVENOUS AS NEEDED
Status: DISCONTINUED | OUTPATIENT
Start: 2019-01-01 | End: 2019-01-01 | Stop reason: HOSPADM

## 2019-01-01 RX ORDER — ISOSORBIDE MONONITRATE 30 MG/1
30 TABLET, EXTENDED RELEASE ORAL
Status: DISCONTINUED | OUTPATIENT
Start: 2019-01-01 | End: 2019-01-01

## 2019-01-01 RX ORDER — AZITHROMYCIN 250 MG/1
TABLET, FILM COATED ORAL
Qty: 6 TAB | Refills: 0 | Status: SHIPPED | OUTPATIENT
Start: 2019-01-01 | End: 2019-01-01

## 2019-01-01 RX ORDER — HYALURONATE SODIUM 10 MG/ML
2 SYRINGE (ML) INTRAARTICULAR ONCE
Qty: 2 ML | Refills: 0
Start: 2019-01-01 | End: 2019-01-01

## 2019-01-01 RX ORDER — ASPIRIN 81 MG/1
81 TABLET ORAL DAILY
Status: DISCONTINUED | OUTPATIENT
Start: 2019-01-01 | End: 2019-01-01 | Stop reason: HOSPADM

## 2019-01-01 RX ORDER — BIVALIRUDIN 250 MG/5ML
INJECTION, POWDER, LYOPHILIZED, FOR SOLUTION INTRAVENOUS AS NEEDED
Status: DISCONTINUED | OUTPATIENT
Start: 2019-01-01 | End: 2019-01-01 | Stop reason: HOSPADM

## 2019-01-01 RX ORDER — SODIUM CHLORIDE 0.9 % (FLUSH) 0.9 %
5-40 SYRINGE (ML) INJECTION EVERY 8 HOURS
Status: DISCONTINUED | OUTPATIENT
Start: 2019-01-01 | End: 2019-01-01 | Stop reason: HOSPADM

## 2019-01-01 RX ORDER — HYALURONATE SODIUM 10 MG/ML
2 SYRINGE (ML) INTRAARTICULAR ONCE
Qty: 2 ML | Refills: 0 | Status: CANCELLED
Start: 2019-01-01 | End: 2019-01-01

## 2019-01-01 RX ORDER — CLOPIDOGREL BISULFATE 75 MG/1
75 TABLET ORAL DAILY
Status: DISCONTINUED | OUTPATIENT
Start: 2019-01-01 | End: 2019-01-01

## 2019-01-01 RX ORDER — SODIUM CHLORIDE 0.9 % (FLUSH) 0.9 %
5-40 SYRINGE (ML) INJECTION AS NEEDED
Status: DISCONTINUED | OUTPATIENT
Start: 2019-01-01 | End: 2019-01-01 | Stop reason: HOSPADM

## 2019-01-01 RX ORDER — BENZONATATE 100 MG/1
200 CAPSULE ORAL
Qty: 30 CAP | Refills: 0 | Status: SHIPPED | OUTPATIENT
Start: 2019-01-01 | End: 2019-01-01

## 2019-01-01 RX ADMIN — CARVEDILOL 25 MG: 25 TABLET, FILM COATED ORAL at 08:05

## 2019-01-01 RX ADMIN — HYDRALAZINE HYDROCHLORIDE 10 MG: 20 INJECTION, SOLUTION INTRAMUSCULAR; INTRAVENOUS at 00:55

## 2019-01-01 RX ADMIN — CLOPIDOGREL BISULFATE 75 MG: 75 TABLET ORAL at 08:39

## 2019-01-01 RX ADMIN — HYDRALAZINE HYDROCHLORIDE 10 MG: 20 INJECTION INTRAMUSCULAR; INTRAVENOUS at 00:55

## 2019-01-01 RX ADMIN — CARVEDILOL 25 MG: 25 TABLET, FILM COATED ORAL at 17:16

## 2019-01-01 RX ADMIN — Medication 10 ML: at 22:16

## 2019-01-01 RX ADMIN — ISOSORBIDE MONONITRATE 30 MG: 30 TABLET, EXTENDED RELEASE ORAL at 08:05

## 2019-01-01 RX ADMIN — ASPIRIN 81 MG 81 MG: 81 TABLET ORAL at 09:35

## 2019-01-01 RX ADMIN — Medication 81 MG: at 09:35

## 2019-01-01 RX ADMIN — HYDRALAZINE HYDROCHLORIDE 25 MG: 25 TABLET, FILM COATED ORAL at 08:05

## 2019-01-01 RX ADMIN — HYDRALAZINE HYDROCHLORIDE 25 MG: 25 TABLET, FILM COATED ORAL at 18:33

## 2019-01-01 RX ADMIN — Medication 20 ML: at 06:00

## 2019-01-01 RX ADMIN — ALBUTEROL SULFATE 2.5 MG: 2.5 SOLUTION RESPIRATORY (INHALATION) at 16:48

## 2019-01-01 RX ADMIN — ASPIRIN 81 MG: 81 TABLET, COATED ORAL at 08:39

## 2019-01-08 ENCOUNTER — TELEPHONE (OUTPATIENT)
Dept: CARDIOLOGY CLINIC | Age: 79
End: 2019-01-08

## 2019-01-08 NOTE — TELEPHONE ENCOUNTER
Called patient to inquire how he is taking his carvedilol. Medication non-adherence therapy advisory received. Awaiting return call

## 2019-01-15 ENCOUNTER — OFFICE VISIT (OUTPATIENT)
Dept: ORTHOPEDIC SURGERY | Age: 79
End: 2019-01-15

## 2019-01-15 VITALS
BODY MASS INDEX: 28.73 KG/M2 | RESPIRATION RATE: 16 BRPM | DIASTOLIC BLOOD PRESSURE: 78 MMHG | OXYGEN SATURATION: 98 % | HEIGHT: 73 IN | WEIGHT: 216.8 LBS | HEART RATE: 74 BPM | SYSTOLIC BLOOD PRESSURE: 153 MMHG | TEMPERATURE: 95.6 F

## 2019-01-15 DIAGNOSIS — M18.11 PRIMARY OSTEOARTHRITIS OF FIRST CARPOMETACARPAL JOINT OF RIGHT HAND: ICD-10-CM

## 2019-01-15 DIAGNOSIS — M54.50 LUMBAR PAIN: ICD-10-CM

## 2019-01-15 DIAGNOSIS — M17.12 PRIMARY OSTEOARTHRITIS OF LEFT KNEE: ICD-10-CM

## 2019-01-15 DIAGNOSIS — M25.562 CHRONIC PAIN OF LEFT KNEE: Primary | ICD-10-CM

## 2019-01-15 DIAGNOSIS — M70.61 TROCHANTERIC BURSITIS OF RIGHT HIP: ICD-10-CM

## 2019-01-15 DIAGNOSIS — M16.11 PRIMARY OSTEOARTHRITIS OF RIGHT HIP: ICD-10-CM

## 2019-01-15 DIAGNOSIS — M25.551 RIGHT HIP PAIN: ICD-10-CM

## 2019-01-15 DIAGNOSIS — G89.29 CHRONIC PAIN OF LEFT KNEE: Primary | ICD-10-CM

## 2019-01-15 RX ORDER — BETAMETHASONE SODIUM PHOSPHATE AND BETAMETHASONE ACETATE 3; 3 MG/ML; MG/ML
6 INJECTION, SUSPENSION INTRA-ARTICULAR; INTRALESIONAL; INTRAMUSCULAR; SOFT TISSUE ONCE
Qty: 0.5 ML | Refills: 0
Start: 2019-01-15 | End: 2019-01-15

## 2019-01-15 RX ORDER — BUPIVACAINE HYDROCHLORIDE 2.5 MG/ML
4 INJECTION, SOLUTION EPIDURAL; INFILTRATION; INTRACAUDAL ONCE
Qty: 4 ML | Refills: 0
Start: 2019-01-15 | End: 2019-01-15

## 2019-01-15 NOTE — PROGRESS NOTES
Patient: Jd Oneill                MRN: [id number]       SSN: [id number] YOB: 1940        AGE: 70 y.o. SEX: male PCP: Tootie Valentine MD 
01/15/19 Chief Complaint Patient presents with  
 Hip Pain  
  right hip pain HISTORY:  Jd Oneill is a 70 y.o. male who is seen for right hip and left knee pain. He has been experiencing right hip pain for many years. His pains have increased recently. He experiences right knee pain when walking. He has a h/o severe psoriasis that he has been battling for years with a variety of medications. He reports right buttock lower back pain. Pain Assessment  1/15/2019 Location of Pain Hip Location Modifiers Right Severity of Pain 5 Quality of Pain Dull Duration of Pain A few minutes Frequency of Pain Intermittent Aggravating Factors Stairs; Walking;Standing Limiting Behavior Some Relieving Factors Rest;Elevation Occupation, etc:  Mr. Álvaro Grover he retired in 2013 as a  for [employer]. His son and 3 grand children live in [place]. He lives with his wife in [place]. He tries to stay active with house and yard work. He has some heart problems. Current weight is 216 pounds. No results found for: HBA1C, HGBE8, FQU0JQLO, TDG4RRYC, KDW0PAUO Weight Metrics 1/15/2019 10/3/2018 8/30/2018 4/9/2018 3/23/2018 3/7/2018 3/21/2017 Weight 216 lb 12.8 oz 220 lb 222 lb 222 lb 222 lb 221 lb 217 lb BMI 28.6 kg/m2 29.03 kg/m2 29.29 kg/m2 29.29 kg/m2 29.29 kg/m2 29.16 kg/m2 28.63 kg/m2 Patient Active Problem List  
Diagnosis Code  Coronary artery disease, status post stenting of the RCA in May 2005/EF 50%. I25.10  Hypertension  I11.9  Dyslipidemia E78.5  History of right nephrectomy due to renal cell carcinoma in 1998. Z90.5  Mild renal insufficiency N28.9  History of DVT (deep venous thrombosis) in left lower extremity. I82.409  Myalgia, related to statin. M79.10  DESAI (dyspnea on exertion) Z15.51  
 Diastolic dysfunction, left ventricle I51.9 REVIEW OF SYSTEMS: All Below are Negative except: See HPI Constitutional: negative for fever, chills, and weight loss. Cardiovascular: negative for chest pain, claudication, leg swelling, SOB, DESAI Gastrointestinal: Negative for pain, N/V/C/D, Blood in stool or urine, dysuria,  hematuria, incontinence, pelvic pain. Musculoskeletal: See HPI Neurological: Negative for dizziness and weakness. Negative for headaches, Visual changes, confusion, seizures Phychiatric/Behavioral: Negative for depression, memory loss, substance  abuse. Extremities: Negative for hair changes, rash, or skin lesion changes. Hematologic: Negative for bleeding problems, bruising, pallor or swollen lymph  nodes Peripheral Vascular: No calf pain, no circulation deficits. Social History Socioeconomic History  Marital status:  Spouse name: Not on file  Number of children: Not on file  Years of education: Not on file  Highest education level: Not on file Social Needs  Financial resource strain: Not on file  Food insecurity - worry: Not on file  Food insecurity - inability: Not on file  Transportation needs - medical: Not on file  Transportation needs - non-medical: Not on file Occupational History  Not on file Tobacco Use  Smoking status: Former Smoker Years: 40.00 Types: Pipe Last attempt to quit: 4/19/2008 Years since quitting: 10.7  Smokeless tobacco: Never Used Substance and Sexual Activity  Alcohol use: No  
 Drug use: No  
 Sexual activity: Not on file Other Topics Concern  Not on file Social History Narrative  Not on file Allergies Allergen Reactions  Lipitor [Atorvastatin] Myalgia  Morphine Other (comments) Low blood pressure  Niaspan [Niacin] Not Reported This Time  Statins-Hmg-Coa Reductase Inhibitors Myalgia  Zetia [Ezetimibe] Myalgia Current Outpatient Medications Medication Sig  
 betamethasone (CELESTONE SOLUSPAN) 6 mg/mL injection 1 mL by Intra artICUlar route once for 1 dose.  bupivacaine, PF, (MARCAINE, PF,) 0.25 % (2.5 mg/mL) injection 4 mL by Intra artICUlar route once for 1 dose.  clopidogrel (PLAVIX) 75 mg tab TAKE ONE TABLET BY MOUTH ONCE DAILY  ramipril (ALTACE) 10 mg capsule Take 1 Cap by mouth two (2) times a day.  alirocumab (PRALUENT PEN) 150 mg/mL injector pen 1 mL by SubCUTAneous route Once every 2 weeks. (Patient taking differently: 150 mg by SubCUTAneous route Once every 2 weeks. Indications: Patient decided not to take this med)  carvedilol (COREG) 25 mg tablet Take 1 Tab by mouth two (2) times daily (with meals).  ERGOCALCIFEROL, VITAMIN D2, (VITAMIN D2 PO) Take  by mouth.  Omega-3-DHA-EPA-Fish Oil (FISH OIL) 1,000 (120-180) mg Cap Take 2 Caps by mouth two (2) times a day.  ALBUTEROL IN Take 1 Puff by inhalation as needed. No current facility-administered medications for this visit. PHYSICAL EXAMINATION: 
Visit Vitals /78 Pulse 74 Temp 95.6 °F (35.3 °C) (Oral) Resp 16 Ht 6' 1\" (1.854 m) Wt 216 lb 12.8 oz (98.3 kg) SpO2 98% BMI 28.60 kg/m² Appearance: Alert, well appearing and pleasant patient who is in no distress, oriented to person, place/time, and who follows commands. HEENT: Yolanda Parker hears well, does not require hearing aids. His sclera of the eyes are non-icteric. He is breathing normally and no respiratory accessory muscle use is noted. No JVD present and Neck ROM within normal limits. Psychiatric: Affect and mood are appropriate. Oriented x3 Heart[de-identified]  S1, S2 without murmer, regular rhythm Lungs:  Breath sounds clear to auscultation Cardiovascular/Peripheral Vascular: Normal pulses to each foot. Integumentary: No rashes,  wounds, or abrasions. Warm and normal color. No drainage. Gait: slight limp Sensory Exam: Intact/Normal Sensation Lymphatic: No evidence of Lymphedema Vascular:      
Pulses: palpable Varicosities none Wounds/Abrasion: None Present Neuro: Negative, no tremors ORTHO EXAMINATION: 
Examination Right knee Left knee Skin Intact Psoriatic lesions and discoloration from venous stasis Range of motion 120-0 90-5 Effusion - +3 Medial joint line tenderness + + Lateral joint line tenderness - - Popliteal tenderness - -  
Osteophytes palpable - + Rivers - - Patella crepitus - - Anterior drawer - - Lateral laxity - - Medial laxity - - Varus deformity - -  
Valgus deformity - - Pretibial edema - +2 Calf tenderness - - Examination Lumbar Thoracic Skin Psoriatic lesions  Intact Tenderness + - Tightness - - Lordosis Normal N/A Kyphosis N/A Normal  
Scoliosis - - Flexion Fingertips to ankle N/A Extension 10 N/A Knee reflexes Normal N/A Ankle reflexes Normal N/A Straight leg raise - N/A Calf tenderness - N/A Large red scaly areas over trunk and extremities Examination Right hip Skin Psoriatic lesions External Rotation ROM 10 Internal Rotation ROM 0 Trochanteric tenderness + posterior Hip flexion contracture - Antalgic gait - Trendelenberg sign - Lumbar tenderness - Straight leg raise - Calf tenderness - Neurovascular Intact PROCEDURE:  After discussing treatment options, patient's right hip was injected with 4 cc Marcaine and 1/2 cc Celestone. Chart reviewed for the following: 
 Booker Carreon MD, have reviewed the History, Physical and updated the Allergic reactions for Chow Cassette TIME OUT performed immediately prior to start of procedure: 
Booker Carreon MD, have performed the following reviews on Chow Cassette prior to the start of the procedure: 
         
* Patient was identified by name and date of birth * Agreement on procedure being performed was verified * Risks and Benefits explained to the patient * Procedure site verified and marked as necessary * Patient was positioned for comfort * Consent was obtained Time: 2:06 PM  
 
Date of procedure: 1/15/2019 Procedure performed by:  Luann Wang MD 
 
Mr. Cruzito Ferreira tolerated the procedure well with no complications. RADIOGRAPHS: 
XR LEFT KNEE 1/15/19 [place] IMPRESSION:  Three views - No fractures, no effusion, moderately severe joint space narrowing, + osteophytes present. IKDC Grade C 
 
XR RIGHT HIP 1/15/19 [place] IMPRESSION:  AP pelvis and two views - No fractures, moderate joint space narrowing, + osteophytes present. [place] grade 2 IMPRESSION:   
  ICD-10-CM ICD-9-CM 1. Chronic pain of left knee M25.562 719.46 AMB POC X-RAY KNEE 3 VIEW  
 G89.29 338.29   
2. Right hip pain M25.551 719.45 POC XRAY, PELVIS; 1 OR 2 VIEWS  
   betamethasone (CELESTONE SOLUSPAN) 6 mg/mL injection BETAMETHASONE ACETATE & SODIUM PHOSPHATE INJECTION 3 MG EA.  
   DRAIN/INJECT LARGE JOINT/BURSA  
   bupivacaine, PF, (MARCAINE, PF,) 0.25 % (2.5 mg/mL) injection 3. Primary osteoarthritis of first carpometacarpal joint of right hand M18.11 715.14   
4. Lumbar pain M54.5 724.2 5. Primary osteoarthritis of left knee M17.12 715.16   
6. Primary osteoarthritis of right hip M16.11 715.15 betamethasone (CELESTONE SOLUSPAN) 6 mg/mL injection BETAMETHASONE ACETATE & SODIUM PHOSPHATE INJECTION 3 MG EA.  
   DRAIN/INJECT LARGE JOINT/BURSA  
   bupivacaine, PF, (MARCAINE, PF,) 0.25 % (2.5 mg/mL) injection 7. Trochanteric bursitis of right hip M70.61 726.5 betamethasone (CELESTONE SOLUSPAN) 6 mg/mL injection BETAMETHASONE ACETATE & SODIUM PHOSPHATE INJECTION 3 MG EA.  
   DRAIN/INJECT LARGE JOINT/BURSA  
   bupivacaine, PF, (MARCAINE, PF,) 0.25 % (2.5 mg/mL) injection PLAN:  After discussing treatment options, patient's right posterolateral hip was injected with 4 cc Marcaine and 1/2 cc Celestone. He will follow up as needed. There is no need for surgery at this time.    
 
Scribed by Uzma Matta ([address] [place] [address] [address]) as dictated by Ubaldo Alejandre MD

## 2019-04-03 ENCOUNTER — OFFICE VISIT (OUTPATIENT)
Dept: CARDIOLOGY CLINIC | Age: 79
End: 2019-04-03

## 2019-04-03 VITALS
BODY MASS INDEX: 29.03 KG/M2 | OXYGEN SATURATION: 99 % | SYSTOLIC BLOOD PRESSURE: 150 MMHG | WEIGHT: 219 LBS | HEART RATE: 64 BPM | DIASTOLIC BLOOD PRESSURE: 68 MMHG | HEIGHT: 73 IN

## 2019-04-03 DIAGNOSIS — M79.10 MYALGIA: ICD-10-CM

## 2019-04-03 DIAGNOSIS — I25.10 ATHEROSCLEROSIS OF NATIVE CORONARY ARTERY OF NATIVE HEART WITHOUT ANGINA PECTORIS: Primary | ICD-10-CM

## 2019-04-03 DIAGNOSIS — E78.5 DYSLIPIDEMIA: ICD-10-CM

## 2019-04-03 DIAGNOSIS — Z90.5 HISTORY OF NEPHRECTOMY: ICD-10-CM

## 2019-04-03 DIAGNOSIS — R06.09 DOE (DYSPNEA ON EXERTION): ICD-10-CM

## 2019-04-03 DIAGNOSIS — I82.402 DEEP VEIN THROMBOSIS (DVT) OF LEFT LOWER EXTREMITY, UNSPECIFIED CHRONICITY, UNSPECIFIED VEIN (HCC): ICD-10-CM

## 2019-04-03 DIAGNOSIS — N28.9 RENAL INSUFFICIENCY: ICD-10-CM

## 2019-04-03 DIAGNOSIS — I51.9 DIASTOLIC DYSFUNCTION, LEFT VENTRICLE: ICD-10-CM

## 2019-04-03 DIAGNOSIS — I11.9 BENIGN HYPERTENSIVE HEART DISEASE WITHOUT HEART FAILURE: ICD-10-CM

## 2019-04-03 NOTE — PROGRESS NOTES
Manan Morales presents today for Chief Complaint Patient presents with  Follow-up 6 month  Shortness of Breath  
  everyday  Leg Swelling  
  everyday right leg Manan Morales preferred language for health care discussion is english/other. Is someone accompanying this pt? No  
 
Is the patient using any DME equipment during OV? No  
 
Depression Screening: 
3 most recent PHQ Screens 1/15/2019 Little interest or pleasure in doing things Not at all Feeling down, depressed, irritable, or hopeless Not at all Total Score PHQ 2 0 Learning Assessment: 
Learning Assessment 3/7/2018 PRIMARY LEARNER Patient HIGHEST LEVEL OF EDUCATION - PRIMARY LEARNER  -  
BARRIERS PRIMARY LEARNER -  
CO-LEARNER CAREGIVER -  
PRIMARY LANGUAGE ENGLISH  NEED -  
LEARNER PREFERENCE PRIMARY DEMONSTRATION  
ANSWERED BY Patient RELATIONSHIP SELF Abuse Screening: No flowsheet data found. Fall Risk Fall Risk Assessment, last 12 mths 1/15/2019 Able to walk? Yes Fall in past 12 months? No  
 
 
Pt currently taking Antiplatelet therapy? Plavix Coordination of Care: 1. Have you been to the ER, urgent care clinic since your last visit? Hospitalized since your last visit? No  
 
2. Have you seen or consulted any other health care providers outside of the [address] [place] [address] since your last visit? Include any pap smears or colon screening.  No

## 2019-04-03 NOTE — PROGRESS NOTES
HISTORY OF PRESENT ILLNESS Dalila Moritz is a 70 y.o. male. HPI Other than mild chronic dyspnea on exertion, he has no complaints. He denies chest pain, resting dyspnea, orthopnea, PND. He denies palpitation, dizziness or syncope. He denies any symptoms of TIA or amaurosis fugax. He has more shortness of breath with exertion in the cold weather. Summertime, he has much less problems with dyspnea on exertion. He has not been able to exercise much because of the knee pain. He has been reluctant to take any medication for cholesterol at this time. He did not wish to try PCSK9 either. He has history of coronary artery disease and had cardiac catheterization on May 27, 2005, which demonstrated:   
1. 50% stenosis of the mid-LAD. 2. 50% to 60% tubular stenosis at the ostium of the large 3rd obtuse marginal branch of the circumflex artery. 3. 95% stenosis of the mid right coronary artery. 4. Patent left renal artery. 5. Mildly depressed left ventricular systolic function, with EF in the 50% range, with no significant regional wall motion abnormalities. Subsequent stenting of the right coronary artery with a 3.5 x 18-mm Cypher stent was successful. The right ventricular marginal branch was jailed with stenosis and was balloon angioplastied, with 30% residual stenosis. He has had no recurrent angina since then. He has history of asthma and had some wheezing in the past, which has been stable since he has stopped smoking a pipe. He has history of a DVT of the left lower extremity in August 2003 and had been on Coumadin until May 2005. He was a pipe smoker until 2003 and he also had a great deal of exposure to secondhand smoke from his college roommate. He underwent stress nuclear cardiac imaging on 8/30/18 which demonstrated normal perfusion other than small sized fixed defect in the inferior wall which was thought to be related to artifact. EF was calculated at 59%. Review of Systems Constitutional: Negative for malaise/fatigue and weight loss. HENT: Negative for hearing loss. Eyes: Negative for blurred vision and double vision. Respiratory: Positive for shortness of breath. Cardiovascular: Negative for chest pain, palpitations, orthopnea, claudication, leg swelling and PND. Gastrointestinal: Negative for blood in stool, heartburn and melena. Genitourinary: Positive for frequency. Negative for dysuria, hematuria and urgency. Musculoskeletal: Positive for joint pain. Negative for back pain. Skin: Negative for itching and rash. Neurological: Negative for dizziness and loss of consciousness. Psychiatric/Behavioral: Negative for depression and memory loss. Physical Exam  
Constitutional: He is oriented to person, place, and time. He appears well-developed and well-nourished. HENT:  
Head: Normocephalic and atraumatic. Eyes: Pupils are equal, round, and reactive to light. Conjunctivae are normal.  
Neck: Normal range of motion. Neck supple. No JVD present. Cardiovascular: Normal rate, regular rhythm, S1 normal and S2 normal.  No extrasystoles are present. PMI is not displaced. Exam reveals no gallop and no friction rub. No murmur heard. Pulses: 
     Carotid pulses are 3+ on the right side, and 3+ on the left side. Pulmonary/Chest: Effort normal. He has no rales. Abdominal: Soft. There is no tenderness. Musculoskeletal: He exhibits no edema. Neurological: He is alert and oriented to person, place, and time. No cranial nerve deficit. Skin: Skin is warm and dry. Psychiatric: He has a normal mood and affect. His behavior is normal.  
 
Visit Vitals /68 Pulse 64 Ht 6' 1\" (1.854 m) Wt 99.3 kg (219 lb) SpO2 99% BMI 28.89 kg/m² Past Medical History:  
Diagnosis Date  Asthma  Cardiac catheterization 05/27/2005 LM patent. mLAD 50%. Cx mild. OM3 55%. mRCA 95% (Cypher 3.5 x 18 DAJA). EF 50%.   Patent left renal.  
  Cardiac Holter monitor, normal 06/21/2010 Benign Holter study.  Cardiac nuclear imaging test, low risk 06/01/2016 Low risk. No ischemia or prior infarction. No WMA. EF 68%. Neg EKG on pharm stress test.  
 Coronary artery disease May 2005  
 status post stenting of the right coronary artery  DVT (deep venous thrombosis) [facility]) August 2003  
 left lower extremity  Dyslipidemia  History of nephrectomy 1998  
 right, secondary to cancer  History of vasectomy  Hypertension  Renal insufficiency   
 mild, with a creatinine of 1.6 prior to cardiac catheterization in May 2005 Social History Socioeconomic History  Marital status:  Spouse name: Not on file  Number of children: Not on file  Years of education: Not on file  Highest education level: Not on file Occupational History  Not on file Social Needs  Financial resource strain: Not on file  Food insecurity:  
  Worry: Not on file Inability: Not on file  Transportation needs:  
  Medical: Not on file Non-medical: Not on file Tobacco Use  Smoking status: Former Smoker Years: 40.00 Types: Pipe Last attempt to quit: 4/19/2008 Years since quitting: 10.9  Smokeless tobacco: Never Used Substance and Sexual Activity  Alcohol use: No  
 Drug use: No  
 Sexual activity: Not on file Lifestyle  Physical activity:  
  Days per week: Not on file Minutes per session: Not on file  Stress: Not on file Relationships  Social connections:  
  Talks on phone: Not on file Gets together: Not on file Attends Catholic service: Not on file Active member of club or organization: Not on file Attends meetings of clubs or organizations: Not on file Relationship status: Not on file  Intimate partner violence:  
  Fear of current or ex partner: Not on file Emotionally abused: Not on file Physically abused: Not on file Forced sexual activity: Not on file Other Topics Concern  Not on file Social History Narrative  Not on file Family History Problem Relation Age of Onset  Heart Attack Mother  at age 79 from MI. She also had enlarged heart and asthma.  Hypertension Mother  Cancer Father Liver cancer Past Surgical History:  
Procedure Laterality Date  HX CORONARY STENT PLACEMENT  05 RCA with 3.5 x 18-mm Cypher stent  HX HEART CATHETERIZATION  05 [address]  
 right  HX VASECTOMY Current Outpatient Medications Medication Sig Dispense Refill  clopidogrel (PLAVIX) 75 mg tab TAKE ONE TABLET BY MOUTH ONCE DAILY 90 Tab 3  
 ramipril (ALTACE) 10 mg capsule Take 1 Cap by mouth two (2) times a day. 180 Cap 3  carvedilol (COREG) 25 mg tablet Take 1 Tab by mouth two (2) times daily (with meals). 180 Tab 3  
 ERGOCALCIFEROL, VITAMIN D2, (VITAMIN D2 PO) Take  by mouth.  Omega-3-DHA-EPA-Fish Oil (FISH OIL) 1,000 (120-180) mg Cap Take 2 Caps by mouth two (2) times a day.  ALBUTEROL IN Take 1 Puff by inhalation as needed.  alirocumab (PRALUENT PEN) 150 mg/mL injector pen 1 mL by SubCUTAneous route Once every 2 weeks. (Patient taking differently: 150 mg by SubCUTAneous route Once every 2 weeks. Indications: Patient decided not to take this med) 2 Syringe 3 EKG: unchanged from previous tracings, normal sinus rhythm, 1st degree AV block. ASSESSMENT and PLAN Encounter Diagnoses Name Primary?  Atherosclerosis of native coronary artery of native heart without angina pectoris,status post stenting of the RCA in May 2005/EF 50%. Yes  Hypertension  Dyslipidemia  Deep vein thrombosis (DVT) of left lower extremity, unspecified chronicity, unspecified vein (HCC)  History of right nephrectomy due to renal cell carcinoma in .  Mild renal insufficiency  Myalgia, related to statin.  DESAI (dyspnea on exertion)  Diastolic dysfunction, left ventricle He has been doing quite well. He has had no symptoms to indicate angina or cardiac decompensation. His chronic dyspnea on exertion has not changed much, most likely secondary to mild COPD and left ventricular diastolic dysfunction. Unfortunately, he has not been able to exercise much because of the knee pain, but has been reasonably active working in the yard and walking with his dog. We discussed the statin, Zetia or PCSK9 inhibitor and he remains reluctant to take any medication for cholesterol at this time. His blood pressure seems to be under control. For now, continue on current medical regimen.

## 2019-04-05 ENCOUNTER — DOCUMENTATION ONLY (OUTPATIENT)
Dept: CARDIOLOGY CLINIC | Age: 79
End: 2019-04-05

## 2019-04-05 ENCOUNTER — OFFICE VISIT (OUTPATIENT)
Dept: ORTHOPEDIC SURGERY | Facility: CLINIC | Age: 79
End: 2019-04-05

## 2019-04-05 VITALS
BODY MASS INDEX: 28.73 KG/M2 | TEMPERATURE: 96.6 F | WEIGHT: 216.8 LBS | OXYGEN SATURATION: 100 % | HEART RATE: 67 BPM | RESPIRATION RATE: 12 BRPM | SYSTOLIC BLOOD PRESSURE: 213 MMHG | DIASTOLIC BLOOD PRESSURE: 90 MMHG | HEIGHT: 73 IN

## 2019-04-05 DIAGNOSIS — M70.61 TROCHANTERIC BURSITIS OF RIGHT HIP: ICD-10-CM

## 2019-04-05 DIAGNOSIS — M54.50 LUMBAR PAIN: ICD-10-CM

## 2019-04-05 DIAGNOSIS — G89.29 CHRONIC PAIN OF LEFT KNEE: ICD-10-CM

## 2019-04-05 DIAGNOSIS — L40.50 PSORIATIC ARTHRITIS (HCC): ICD-10-CM

## 2019-04-05 DIAGNOSIS — M18.11 PRIMARY OSTEOARTHRITIS OF FIRST CARPOMETACARPAL JOINT OF RIGHT HAND: Primary | ICD-10-CM

## 2019-04-05 DIAGNOSIS — M17.12 PRIMARY OSTEOARTHRITIS OF LEFT KNEE: ICD-10-CM

## 2019-04-05 DIAGNOSIS — M25.551 RIGHT HIP PAIN: ICD-10-CM

## 2019-04-05 DIAGNOSIS — M16.11 PRIMARY OSTEOARTHRITIS OF RIGHT HIP: ICD-10-CM

## 2019-04-05 DIAGNOSIS — M25.562 CHRONIC PAIN OF LEFT KNEE: ICD-10-CM

## 2019-04-05 RX ORDER — BETAMETHASONE SODIUM PHOSPHATE AND BETAMETHASONE ACETATE 3; 3 MG/ML; MG/ML
6 INJECTION, SUSPENSION INTRA-ARTICULAR; INTRALESIONAL; INTRAMUSCULAR; SOFT TISSUE ONCE
Qty: 0.5 ML | Refills: 0
Start: 2019-04-05 | End: 2019-04-05

## 2019-04-16 DIAGNOSIS — I25.10 ATHEROSCLEROSIS OF NATIVE CORONARY ARTERY OF NATIVE HEART WITHOUT ANGINA PECTORIS: ICD-10-CM

## 2019-04-16 DIAGNOSIS — I11.9 BENIGN HYPERTENSIVE HEART DISEASE WITHOUT HEART FAILURE: ICD-10-CM

## 2019-04-16 RX ORDER — CARVEDILOL 25 MG/1
25 TABLET ORAL 2 TIMES DAILY WITH MEALS
Qty: 180 TAB | Refills: 3 | Status: SHIPPED | OUTPATIENT
Start: 2019-04-16

## 2019-05-21 RX ORDER — CLOPIDOGREL BISULFATE 75 MG/1
TABLET ORAL
Qty: 90 TAB | Refills: 3 | Status: SHIPPED | OUTPATIENT
Start: 2019-05-21

## 2019-06-04 ENCOUNTER — HOSPITAL ENCOUNTER (OUTPATIENT)
Dept: LAB | Age: 79
Discharge: HOME | End: 2019-06-04
Payer: MEDICARE

## 2019-06-04 DIAGNOSIS — H35.372 LEFT EPIRETINAL MEMBRANE: ICD-10-CM

## 2019-06-04 DIAGNOSIS — Z01.818 PRE-OP EXAM: ICD-10-CM

## 2019-06-04 LAB
ATRIAL RATE: 58 BPM
BASOPHILS # BLD: 0 K/UL (ref 0–0.1)
BASOPHILS NFR BLD: 1 % (ref 0–2)
CALCULATED P AXIS, ECG09: 79 DEGREES
CALCULATED R AXIS, ECG10: 90 DEGREES
CALCULATED T AXIS, ECG11: 64 DEGREES
DIAGNOSIS, 93000: NORMAL
DIFFERENTIAL METHOD BLD: ABNORMAL
EOSINOPHIL # BLD: 0.4 K/UL (ref 0–0.4)
EOSINOPHIL NFR BLD: 6 % (ref 0–5)
ERYTHROCYTE [DISTWIDTH] IN BLOOD BY AUTOMATED COUNT: 13.8 % (ref 11.6–14.5)
HCT VFR BLD AUTO: 35.1 % (ref 36–48)
HGB BLD-MCNC: 11.5 G/DL (ref 13–16)
LYMPHOCYTES # BLD: 1.4 K/UL (ref 0.9–3.6)
LYMPHOCYTES NFR BLD: 23 % (ref 21–52)
MCH RBC QN AUTO: 27 PG (ref 24–34)
MCHC RBC AUTO-ENTMCNC: 32.8 G/DL (ref 31–37)
MCV RBC AUTO: 82.4 FL (ref 74–97)
MONOCYTES # BLD: 0.6 K/UL (ref 0.05–1.2)
MONOCYTES NFR BLD: 9 % (ref 3–10)
NEUTS SEG # BLD: 3.8 K/UL (ref 1.8–8)
NEUTS SEG NFR BLD: 61 % (ref 40–73)
P-R INTERVAL, ECG05: 216 MS
PLATELET # BLD AUTO: 154 K/UL (ref 135–420)
PMV BLD AUTO: 9.5 FL (ref 9.2–11.8)
Q-T INTERVAL, ECG07: 404 MS
QRS DURATION, ECG06: 100 MS
QTC CALCULATION (BEZET), ECG08: 396 MS
RBC # BLD AUTO: 4.26 M/UL (ref 4.7–5.5)
VENTRICULAR RATE, ECG03: 58 BPM
WBC # BLD AUTO: 6.2 K/UL (ref 4.6–13.2)

## 2019-06-04 PROCEDURE — 85025 COMPLETE CBC W/AUTO DIFF WBC: CPT

## 2019-06-04 PROCEDURE — 36415 COLL VENOUS BLD VENIPUNCTURE: CPT

## 2019-06-04 PROCEDURE — 93005 ELECTROCARDIOGRAM TRACING: CPT

## 2019-06-18 RX ORDER — RAMIPRIL 10 MG/1
CAPSULE ORAL
Qty: 180 CAP | Refills: 3 | Status: SHIPPED | OUTPATIENT
Start: 2019-06-18

## 2019-10-02 NOTE — PROGRESS NOTES
Dionne Kim presents today for No chief complaint on file. Dionne Kim preferred language for health care discussion is english/other. Is someone accompanying this pt? no 
 
Is the patient using any DME equipment during OV? no 
 
Depression Screening: 
3 most recent PHQ Screens 4/5/2019 Little interest or pleasure in doing things Not at all Feeling down, depressed, irritable, or hopeless Not at all Total Score PHQ 2 0 Learning Assessment: 
Learning Assessment 3/7/2018 PRIMARY LEARNER Patient HIGHEST LEVEL OF EDUCATION - PRIMARY LEARNER  -  
BARRIERS PRIMARY LEARNER -  
CO-LEARNER CAREGIVER -  
PRIMARY LANGUAGE ENGLISH  NEED -  
LEARNER PREFERENCE PRIMARY DEMONSTRATION  
ANSWERED BY Patient RELATIONSHIP SELF Abuse Screening: No flowsheet data found. Fall Risk Fall Risk Assessment, last 12 mths 4/5/2019 Able to walk? Yes Fall in past 12 months? No  
 
 
Pt currently taking Anticoagulant therapy? yes Coordination of Care: 1. Have you been to the ER, urgent care clinic since your last visit? Hospitalized since your last visit? no 
 
2. Have you seen or consulted any other health care providers outside of the [address] [place] [address] since your last visit? Include any pap smears or colon screening.  no

## 2019-10-02 NOTE — TELEPHONE ENCOUNTER
Patient wife aware that the procedure has been moved to October 7th at [phone] and patient will need to be there at 815 . Get lab work done this week And Imdur is being prescribed also and he will need to  rx and no physical exertion till after procedure.

## 2019-10-02 NOTE — PROGRESS NOTES
HISTORY OF PRESENT ILLNESS Jayant Staley is a 70 y.o. male. HPI He is complaining of exertional chest pain. For the past few weeks he has been experiencing exertional chest tightness and aching sensation. This occurs usually with physical exertion after a meal. The pain starts in the right upper arm and spreads into his chest and into the left arm. It lasts for approximately three to four minutes and subsides upon resting. He has not had any resting chest pain. He indicates that this pain feels similar to the pain he had in 2005 before the stenting. He does have some shortness of breath with the pain. He denies resting dyspnea, orthopnea, PND. He does have a great deal of difficulty with psoriasis and psoriatic arthritis. He denies palpitations, dizziness or syncope. He has had no symptoms to indicate TIA or amaurosis fugax. He has not been able to tolerate the statin. He has history of coronary artery disease and had cardiac catheterization on May 27, 2005, which demonstrated:   
1. 50% stenosis of the mid-LAD. 2. 50% to 60% tubular stenosis at the ostium of the large 3rd obtuse marginal branch of the circumflex artery. 3. 95% stenosis of the mid right coronary artery. 4. Patent left renal artery. 5. Mildly depressed left ventricular systolic function, with EF in the 50% range, with no significant regional wall motion abnormalities. Subsequent stenting of the right coronary artery with a 3.5 x 18-mm Cypher stent was successful. The right ventricular marginal branch was jailed with stenosis and was balloon angioplastied, with 30% residual stenosis. He has had no recurrent angina since then. He has history of asthma and had some wheezing in the past, which has been stable since he has stopped smoking a pipe. He has history of a DVT of the left lower extremity in August 2003 and had been on Coumadin until May 2005.  He was a pipe smoker until 2003 and he also had a great deal of exposure to secondhand smoke from his college roommate. 
  
He underwent stress nuclear cardiac imaging on 8/30/18 which demonstrated normal perfusion other than small sized fixed defect in the inferior wall which was thought to be related to artifact. EF was calculated at 59%. Review of Systems Constitutional: Negative for malaise/fatigue and weight loss. HENT: Negative for hearing loss. Eyes: Negative for blurred vision and double vision. Respiratory: Positive for shortness of breath. Cardiovascular: Positive for chest pain. Negative for palpitations, orthopnea, claudication, leg swelling and PND. Gastrointestinal: Negative for blood in stool, heartburn and melena. Genitourinary: Positive for frequency. Negative for dysuria, hematuria and urgency. Musculoskeletal: Positive for joint pain. Negative for back pain. Skin: Negative for itching and rash. Neurological: Negative for dizziness and loss of consciousness. Psychiatric/Behavioral: Negative for depression and memory loss. Physical Exam  
Constitutional: He is oriented to person, place, and time. He appears well-developed and well-nourished. HENT:  
Head: Normocephalic and atraumatic. Eyes: Pupils are equal, round, and reactive to light. Conjunctivae are normal.  
Neck: Normal range of motion. Neck supple. No JVD present. Cardiovascular: Normal rate, regular rhythm, S1 normal and S2 normal.  No extrasystoles are present. PMI is not displaced. Exam reveals no gallop and no friction rub. No murmur heard. Pulses: 
     Carotid pulses are 3+ on the right side, and 3+ on the left side. Pulmonary/Chest: Effort normal. He has no rales. Abdominal: Soft. There is no tenderness. Musculoskeletal: He exhibits no edema. Neurological: He is alert and oriented to person, place, and time. No cranial nerve deficit. Skin: Skin is warm and dry. Psychiatric: He has a normal mood and affect. His behavior is normal.  
 
Visit Vitals /64 Pulse 60 Ht 6' 1\" (1.854 m) Wt 95.3 kg (210 lb) SpO2 98% BMI 27.71 kg/m² Past Medical History:  
Diagnosis Date  Asthma  Cardiac catheterization 2005 LM patent. mLAD 50%. Cx mild. OM3 55%. mRCA 95% (Cypher 3.5 x 18 DAJA). EF 50%. Patent left renal.  
 Cardiac Holter monitor, normal 2010 Benign Holter study.  Cardiac nuclear imaging test, low risk 2016 Low risk. No ischemia or prior infarction. No WMA. EF 68%. Neg EKG on pharm stress test.  
 Coronary artery disease May 2005  
 status post stenting of the right coronary artery  DVT (deep venous thrombosis) [facility]) 2003  
 left lower extremity  Dyslipidemia  History of nephrectomy   
 right, secondary to cancer  History of vasectomy  Hypertension  Renal insufficiency   
 mild, with a creatinine of 1.6 prior to cardiac catheterization in May 2005 Social History Socioeconomic History  Marital status:  Spouse name: Not on file  Number of children: Not on file  Years of education: Not on file  Highest education level: Not on file Occupational History  Not on file Social Needs  Financial resource strain: Not on file  Food insecurity:  
  Worry: Not on file Inability: Not on file  Transportation needs:  
  Medical: Not on file Non-medical: Not on file Tobacco Use  Smoking status: Former Smoker Years: 40.00 Types: Pipe Last attempt to quit: 2008 Years since quittin.4  Smokeless tobacco: Never Used Substance and Sexual Activity  Alcohol use: No  
 Drug use: No  
 Sexual activity: Not on file Lifestyle  Physical activity:  
  Days per week: Not on file Minutes per session: Not on file  Stress: Not on file Relationships  Social connections:  
  Talks on phone: Not on file Gets together: Not on file Attends Bahai service: Not on file Active member of club or organization: Not on file Attends meetings of clubs or organizations: Not on file Relationship status: Not on file  Intimate partner violence:  
  Fear of current or ex partner: Not on file Emotionally abused: Not on file Physically abused: Not on file Forced sexual activity: Not on file Other Topics Concern  Not on file Social History Narrative  Not on file Family History Problem Relation Age of Onset  Heart Attack Mother  at age 79 from MI. She also had enlarged heart and asthma.  Hypertension Mother  Cancer Father Liver cancer Past Surgical History:  
Procedure Laterality Date  HX CORONARY STENT PLACEMENT  05 RCA with 3.5 x 18-mm Cypher stent  HX HEART CATHETERIZATION  05 [address]  
 right  HX VASECTOMY Current Outpatient Medications Medication Sig Dispense Refill  ramipril (ALTACE) 10 mg capsule TAKE 1 CAPSULE BY MOUTH TWICE DAILY 180 Cap 3  clopidogrel (PLAVIX) 75 mg tab TAKE ONE TABLET BY MOUTH ONCE DAILY 90 Tab 3  carvedilol (COREG) 25 mg tablet Take 1 Tab by mouth two (2) times daily (with meals). 180 Tab 3  
 alirocumab (PRALUENT PEN) 150 mg/mL injector pen 1 mL by SubCUTAneous route Once every 2 weeks. (Patient taking differently: 150 mg by SubCUTAneous route Once every 2 weeks. Indications: Patient decided not to take this med) 2 Syringe 3  
 ERGOCALCIFEROL, VITAMIN D2, (VITAMIN D2 PO) Take  by mouth.  Omega-3-DHA-EPA-Fish Oil (FISH OIL) 1,000 (120-180) mg Cap Take 2 Caps by mouth two (2) times a day.  ALBUTEROL IN Take 1 Puff by inhalation as needed. EKG: unchanged from previous tracings, normal sinus rhythm, nonspecific ST and T waves changes, 1st degree AV block. ASSESSMENT and PLAN Encounter Diagnoses Name Primary?  Exertional angina (HCC),unstable Yes  Atherosclerosis of native coronary artery of native heart without angina pectoris,status post stenting of the RCA in May 2005/EF 50%.  Hypertension  Dyslipidemia  Deep vein thrombosis (DVT) of left lower extremity, unspecified chronicity, unspecified vein (HCC)  History of right nephrectomy due to renal cell carcinoma in 1998.  Mild renal insufficiency  Myalgia, related to statin.  DESAI (dyspnea on exertion)  Diastolic dysfunction, left ventricle This patient's presenting chest pain is highly suggestive of new onset exertional angina. He has a history of 50% stenosis of the mid LAD in 2005 when he had stent placement in the right coronary artery. He also had moderate stenosis in the circumflex branch as well. At this point, I would proceed with cardiac catheterization rather than considering a stress test. I will start him on long acting nitrates and his activity will be restricted until a complete diagnosis is established.

## 2019-10-02 NOTE — PATIENT INSTRUCTIONS
Instructions Patients Name:  Adarsh Lewis 1. You are scheduled to have a Left Heart Catheterization on October 16, 2019  at 0915 am.   Please check in at [facility] am.      
 
2. Please go to [facility] and park in the outpatient parking lot that is located around to the back of the hospital and enter through the [place]. Once you enter through the [facility] check in with the  there. The  will either give you directions or assist you in getting to the cath holding area. 3. You are not to eat or drink anything after midnight the night before your procedure. Small sips of water to take your medications is ok. 4. If you are diabetic, do not take your insulin/sugar pill the morning of the procedure. 5. MEDICATION INSTRUCTIONS:   Please take your morning medications with the following special instructions: 
 
[x]          Please make sure to take your Blood pressure medication :  
 
[x]          Take your Aspirin and/or Plavix. 6. We encourage families to wait in the waiting room on the first floor while the procedure is being done. The Doctor will come out and talk with you as soon as the procedure is over. 7. There is the possibility that you may spend the night in the hospital, depending on the results of the procedure. This will be determined after the procedure is done. If angioplasty or stent is planned, you will stay at least one day. 8. If you or your family have any questions, please call our office Monday Friday, 9:00 a. m.4:30 p.m.,  At [phone], and ask to speak to one of the nurses.

## 2019-10-07 PROBLEM — I25.10 CAD (CORONARY ARTERY DISEASE): Status: ACTIVE | Noted: 2019-01-01

## 2019-10-07 NOTE — H&P
Addendum: He has recurrence of exertional angina. He has known history of CAD by previous coronary angiography almost 14 years ago. We will proceed with coronary angiography. All questions answered. [facility] 
10/7/2019 HISTORY OF PRESENT ILLNESS Robin Charles is a 70 y.o. male. 
  
HPI He is complaining of exertional chest pain. For the past few weeks he has been experiencing exertional chest tightness and aching sensation. This occurs usually with physical exertion after a meal. The pain starts in the right upper arm and spreads into his chest and into the left arm. It lasts for approximately three to four minutes and subsides upon resting. He has not had any resting chest pain. He indicates that this pain feels similar to the pain he had in 2005 before the stenting. He does have some shortness of breath with the pain. He denies resting dyspnea, orthopnea, PND. He does have a great deal of difficulty with psoriasis and psoriatic arthritis. He denies palpitations, dizziness or syncope. He has had no symptoms to indicate TIA or amaurosis fugax. He has not been able to tolerate the statin.  
  
He has history of coronary artery disease and had cardiac catheterization on May 27, 2005, which demonstrated:   
1. 50% stenosis of the mid-LAD. 2. 50% to 60% tubular stenosis at the ostium of the large 3rd obtuse marginal branch of the circumflex artery. 3. 95% stenosis of the mid right coronary artery. 4. Patent left renal artery. 5. Mildly depressed left ventricular systolic function, with EF in the 50% range, with no significant regional wall motion abnormalities. Subsequent stenting of the right coronary artery with a 3.5 x 18-mm Cypher stent was successful. The right ventricular marginal branch was jailed with stenosis and was balloon angioplastied, with 30% residual stenosis. He has had no recurrent angina since then.  He has history of asthma and had some wheezing in the past, which has been stable since he has stopped smoking a pipe. He has history of a DVT of the left lower extremity in August 2003 and had been on Coumadin until May 2005. He was a pipe smoker until 2003 and he also had a great deal of exposure to secondhand smoke from his college roommate. 
  
He underwent stress nuclear cardiac imaging on 8/30/18 which demonstrated normal perfusion other than small sized fixed defect in the inferior wall which was thought to be related to artifact. EF was calculated at 59%. 
  
Review of Systems Constitutional: Negative for malaise/fatigue and weight loss. HENT: Negative for hearing loss. Eyes: Negative for blurred vision and double vision. Respiratory: Positive for shortness of breath. Cardiovascular: Positive for chest pain. Negative for palpitations, orthopnea, claudication, leg swelling and PND. Gastrointestinal: Negative for blood in stool, heartburn and melena. Genitourinary: Positive for frequency. Negative for dysuria, hematuria and urgency. Musculoskeletal: Positive for joint pain. Negative for back pain. Skin: Negative for itching and rash. Neurological: Negative for dizziness and loss of consciousness. Psychiatric/Behavioral: Negative for depression and memory loss.  
  
  
Physical Exam  
Constitutional: He is oriented to person, place, and time. He appears well-developed and well-nourished. HENT:  
Head: Normocephalic and atraumatic. Eyes: Pupils are equal, round, and reactive to light. Conjunctivae are normal.  
Neck: Normal range of motion. Neck supple. No JVD present. Cardiovascular: Normal rate, regular rhythm, S1 normal and S2 normal.  No extrasystoles are present. PMI is not displaced. Exam reveals no gallop and no friction rub. No murmur heard. Pulses: 
     Carotid pulses are 3+ on the right side, and 3+ on the left side. Pulmonary/Chest: Effort normal. He has no rales. Abdominal: Soft. There is no tenderness. Musculoskeletal: He exhibits no edema. Neurological: He is alert and oriented to person, place, and time. No cranial nerve deficit. Skin: Skin is warm and dry. Psychiatric: He has a normal mood and affect. His behavior is normal.  
  
Visit Vitals /64 Pulse 60 Ht 6' 1\" (1.854 m) Wt 95.3 kg (210 lb) SpO2 98% BMI 27.71 kg/m²  
  
  
    
Past Medical History:  
Diagnosis Date  Asthma    
 Cardiac catheterization 2005  
  LM patent. mLAD 50%. Cx mild. OM3 55%. mRCA 95% (Cypher 3.5 x 18 DAJA). EF 50%. Patent left renal.  
 Cardiac Holter monitor, normal 2010  
  Benign Holter study.  Cardiac nuclear imaging test, low risk 2016  
  Low risk. No ischemia or prior infarction. No WMA. EF 68%. Neg EKG on pharm stress test.  
 Coronary artery disease May 2005  
  status post stenting of the right coronary artery  DVT (deep venous thrombosis) [facility]) 2003  
  left lower extremity  Dyslipidemia    
 History of nephrectomy   
  right, secondary to cancer  History of vasectomy    
 Hypertension     
 Renal insufficiency    
  mild, with a creatinine of 1.6 prior to cardiac catheterization in May 2005  
  
  
Social History  
  
     
Socioeconomic History  Marital status:   
    Spouse name: Not on file  Number of children: Not on file  Years of education: Not on file  Highest education level: Not on file Occupational History  Not on file Social Needs  Financial resource strain: Not on file  Food insecurity:  
    Worry: Not on file  
    Inability: Not on file  Transportation needs:  
    Medical: Not on file  
    Non-medical: Not on file Tobacco Use  Smoking status: Former Smoker  
    Years: 40.00  
    Types: Pipe  
    Last attempt to quit: 2008  
    Years since quittin.4  Smokeless tobacco: Never Used Substance and Sexual Activity  Alcohol use: No  
 Drug use: No  
 Sexual activity: Not on file Lifestyle  Physical activity:  
    Days per week: Not on file  
    Minutes per session: Not on file  Stress: Not on file Relationships  Social connections:  
    Talks on phone: Not on file  
    Gets together: Not on file  
    Attends Judaism service: Not on file  
    Active member of club or organization: Not on file  
    Attends meetings of clubs or organizations: Not on file  
    Relationship status: Not on file  Intimate partner violence:  
    Fear of current or ex partner: Not on file  
    Emotionally abused: Not on file  
    Physically abused: Not on file  
    Forced sexual activity: Not on file Other Topics Concern  Not on file Social History Narrative  Not on file  
  
  
     
Family History Problem Relation Age of Onset  Heart Attack Mother    
       at age 79 from MI. She also had enlarged heart and asthma.  Hypertension Mother    
 Cancer Father    
      Liver cancer  
  
  
     
Past Surgical History:  
Procedure Laterality Date  HX CORONARY STENT PLACEMENT   05  
  RCA with 3.5 x 18-mm Cypher stent  HX HEART CATHETERIZATION   05  HX NEPHRECTOMY   1998  
  right  HX VASECTOMY      
  
  
      
Current Outpatient Medications Medication Sig Dispense Refill  ramipril (ALTACE) 10 mg capsule TAKE 1 CAPSULE BY MOUTH TWICE DAILY 180 Cap 3  clopidogrel (PLAVIX) 75 mg tab TAKE ONE TABLET BY MOUTH ONCE DAILY 90 Tab 3  carvedilol (COREG) 25 mg tablet Take 1 Tab by mouth two (2) times daily (with meals). 180 Tab 3  
 alirocumab (PRALUENT PEN) 150 mg/mL injector pen 1 mL by SubCUTAneous route Once every 2 weeks. (Patient taking differently: 150 mg by SubCUTAneous route Once every 2 weeks.  Indications: Patient decided not to take this med) 2 Syringe 3  
 ERGOCALCIFEROL, VITAMIN D2, (VITAMIN D2 PO) Take  by mouth.      
  Omega-3-DHA-EPA-Fish Oil (FISH OIL) 1,000 (120-180) mg Cap Take 2 Caps by mouth two (2) times a day.      
 ALBUTEROL IN Take 1 Puff by inhalation as needed.      
  
  
EKG: unchanged from previous tracings, normal sinus rhythm, nonspecific ST and T waves changes, 1st degree AV block. 
  
ASSESSMENT and PLAN Encounter Diagnoses Name Primary?  Exertional angina (HCC),unstable Yes  Atherosclerosis of native coronary artery of native heart without angina pectoris,status post stenting of the RCA in May 2005/EF 50%.    
 Hypertension     
 Dyslipidemia    
 Deep vein thrombosis (DVT) of left lower extremity, unspecified chronicity, unspecified vein (HCC)    
 History of right nephrectomy due to renal cell carcinoma in 1998.    
 Mild renal insufficiency    
 Myalgia, related to statin.    
 DESAI (dyspnea on exertion)    
 Diastolic dysfunction, left ventricle    
This patient's presenting chest pain is highly suggestive of new onset exertional angina. He has a history of 50% stenosis of the mid LAD in 2005 when he had stent placement in the right coronary artery. He also had moderate stenosis in the circumflex branch as well. At this point, I would proceed with cardiac catheterization rather than considering a stress test. I will start him on long acting nitrates and his activity will be restricted until a complete diagnosis is established.

## 2019-10-07 NOTE — Clinical Note
Lesion located in the Proximal LAD. Balloon inflated using multiple inflations inflation technique. Pressure = 16 jeremie; Duration = 11 sec. Inflation 2: Pressure: 18 jeremie; Duration: 15 sec. Inflation 3: Pressure: 14 jeremie; Duration: 12 sec.

## 2019-10-07 NOTE — ROUTINE PROCESS
1600 - Received report from [place], [facility] from cath lab holding. 1620 - Pt transported to [facility] [place] on wheelchair. 1630 - Admission assessment, admission documents completed, pt is connected to monitor and VS were taken. 1715 - Administered scheduled med. Pt is sitting in chair waiting for meal to come. Dietary informed that pt has meal ordered. 1824 - Called pharmacist in regard to new order of hydralazine. Pending verification. Pt in room eating meal. 
 
1840 - Checked patient's BP and administered BP med. Pt is sitting in chair. Provided comfort measures but putting blanket on patient. Call light within reach. 1900 - Report to Mikey Bob, RN for night shift.

## 2019-10-07 NOTE — PROGRESS NOTES
TRANSFER - OUT REPORT: 
 
Verbal report given to Manuela Fink 58 and Prachi Meehan RN(name) on [facility]  being transferred to [facility](unit) for routine progression of care Report consisted of patients Situation, Background, Assessment and  
Recommendations(SBAR). Information from the following report(s) SBAR, Kardex, Procedure Summary and MAR was reviewed with the receiving nurse. Lines:    
 
Opportunity for questions and clarification was provided. Patient transported with: 
 Registered Nurse Dressing site verified with Becka Medina RN site dry and intact no bleeding or hematoma noted.  Pt transferred with LIZZIE

## 2019-10-07 NOTE — PROGRESS NOTES
Problem: Falls - Risk of 
Goal: *Absence of Falls Description Document Ivar Du Fall Risk and appropriate interventions in the flowsheet. Outcome: Progressing Towards Goal 
Note:  
Fall Risk Interventions: 
Mobility Interventions: Bed/chair exit alarm, Patient to call before getting OOB Medication Interventions: Bed/chair exit alarm, Patient to call before getting OOB Problem: Patient Education: Go to Patient Education Activity Goal: Patient/Family Education Outcome: Progressing Towards Goal

## 2019-10-07 NOTE — PROGRESS NOTES
10/07/19 9206 Vitals Temp 98.1 °F (36.7 °C) Temp Source Oral  
Pulse (Heart Rate) 80 Heart Rate Source Monitor Resp Rate 27  
O2 Sat (%) (!) 80 % Level of Consciousness Alert /74 MAP (Monitor) 104 MAP (Calculated) 100 BP 1 Location Left arm BP 1 Method Automatic  
BP Patient Position At rest  
MEWS Score 2 Pt  AXO, 4, brought to [facility] ambulatory. Pt placed in bed 5 and connected to monitor. Baseline VS taken and PIV started x 2. Admission database completed. Pt ready for ordered procedure.

## 2019-10-07 NOTE — Clinical Note
Right groin and right radial prepped with ChloraPrep and draped. Wet prep solution applied at: 1000. Wet prep solution dried at: 1003. Wet prep elapsed drying time: 3 mins.

## 2019-10-07 NOTE — Clinical Note
TRANSFER - IN REPORT:  
 
Verbal report received from: Eden Francis RN. Report consisted of patient's Situation, Background, Assessment and  
Recommendations(SBAR). Opportunity for questions and clarification was provided. Assessment completed upon patient's arrival to unit and care assumed. Patient transported with a Cardiac Cath Tech / Patient Care Tech.

## 2019-10-07 NOTE — Clinical Note
Lesion: Located in the Proximal LAD. Stent deployed. Multiple inflations used. First inflation pressure = 12 jeremie; inflation time: 35 sec. Second inflation pressure: 14 jeremie; inflation time: 15 sec.

## 2019-10-07 NOTE — Clinical Note
Balloon inserted. Balloon inflated using single inflation technique. Pressure = 6 jeremie; Duration = 12 sec.  Onto second wire

## 2019-10-07 NOTE — Clinical Note
Contrast Dose Calculator:  
Patient's age: 70.  
Patient's sex: Male. Patient weight (kg) = 96. Creatinine level (mg/dL) = 1.39. Creatinine clearance (mL/min): 60.43. Contrast concentration (mg/mL) = 300. MACD = 300 mL. Max Contrast dose per Creatinine Cl calculator = 135.97 mL.

## 2019-10-07 NOTE — Clinical Note
Lesion located in the Mid LAD. Balloon inflated using multiple inflations inflation technique. Pressure = 10 jeremie; Duration = 14 sec. Inflation 2: Pressure: 14 jeremie; Duration: 11 sec.

## 2019-10-07 NOTE — Clinical Note
Lesion located in the Proximal LAD. Balloon inflated using single inflation technique. Pressure = 8 jeremie; Duration = 17 sec.

## 2019-10-07 NOTE — Clinical Note
Lesion 1: Guidewire removed. Guidewire is intact. The guidewire removed due to: unable to cross lesion.

## 2019-10-07 NOTE — Clinical Note
TRANSFER - OUT REPORT:  
 
Verbal report given to: Keysha Yip RN. Report consisted of patient's Situation, Background, Assessment and  
Recommendations(SBAR). Opportunity for questions and clarification was provided. Patient transported with a Cardiac Cath Tech / Patient Care Tech. Patient transported to: [address].

## 2019-10-07 NOTE — PROGRESS NOTES
1920 bedside turnover given to me by FANTA Sol. Pt is alert and oriented sitting up in the chair watching tv. Denies pain and denies shortness of breath. Denies pain and denies needs at this time. 2015 given ice water and vero crackers. Pt sitting in recliner, asked him if he wanted to stay there to sleep he said yes. 2130 in chair watching TV hrly round, no needs no signs of distress 2230 checked on pt denies pain denies needs, covered him up in the chair, assessed bp 
2315 in the recliner, helped him to pull it out  More and push it back more, ice water given, denies pain and denies shortness of breath, no needs at this time 0020 called Dr. Gregoria Bello service to inform BP is 438 systolic and no prn meds available at this time 0030 order given for 10 mg hydralazine one time 0050 pt given hydralazine and turned lights out again 0130  Hourly round in chair sleeping no signs of distress 0210 hrly round in chair sleeping on monitor 0320 hrly round in chair sleeping 
0430 sleeping in the recliner, hrly round no needs no signs of distress or pain 0520 sleeping in the chair 
0620 EKG completed and pt went to the restroom 
0720 Bedside turnover Sbar, [facility], ED summary and a chance to ask questions given to FANTA Rose and Balwinder Gamez. . Also informed of the blood pressure increasing and the call for prn hydralazine. Pt up to the restroom and excited about being discharged, denies pain and denies shortness of breath. Still on the cardiac telemetry monitor.

## 2019-10-08 NOTE — PROGRESS NOTES
D/C order noted for today. Orders reviewed. No needs identified at this time. CM remains available if needed. Spouse to transport home. Observation notice provided in writing to patient and/or caregiver as well as verbal explanation of the policy. Patients who are in outpatient status also receive the Observation notice. Stiven Mcdonald, YEVGENIY [phone]

## 2019-10-08 NOTE — DISCHARGE SUMMARY
[facility]  -  Progress Note Discharge Summary Patient: Isabel Greers MRN: [id number]  SSN: [id number] YOB: 1940  Age: 70 y.o. Sex: male Admit Date: 10/7/2019 Discharge Date: 10/8/2019 Admission Diagnoses: Coronary atherosclerosis of native coronary artery [I25.10] Angina decubitus ([address] [I20.8] CAD (coronary artery disease) [I25.10] CAD (coronary artery disease) [I25.10] Discharge Diagnoses:  
Problem List as of 10/8/2019 Date Reviewed: 4/5/2019 Codes Class Noted - Resolved CAD (coronary artery disease) ICD-10-CM: I25.10 ICD-9-CM: 414.00  10/7/2019 - Present Diastolic dysfunction, left ventricle ICD-10-CM: I51.9 ICD-9-CM: 429.9  6/8/2016 - Present Hypertension  ICD-10-CM: I11.9 ICD-9-CM: 402.10  Unknown - Present Dyslipidemia ICD-10-CM: E78.5 ICD-9-CM: 272.4  Unknown - Present History of right nephrectomy due to renal cell carcinoma in 1998. ICD-10-CM: Z90.5 ICD-9-CM: V45.73  Unknown - Present Overview Signed 4/19/2011  4:21 PM by Peter Bhatia  
  right, secondary to cancer Mild renal insufficiency ICD-10-CM: N28.9 ICD-9-CM: 593.9  Unknown - Present Overview Signed 4/19/2011  4:21 PM by Peter Bhatia  
  mild, with a creatinine of 1.6 prior to cardiac catheterization in May 2005 Myalgia, related to statin. ICD-10-CM: M79.10 ICD-9-CM: 729.1  4/19/2011 - Present DESAI (dyspnea on exertion) ICD-10-CM: R06.09 
ICD-9-CM: 786.09  4/19/2011 - Present Coronary artery disease, status post stenting of the RCA in May 2005/EF 50%. ICD-10-CM: I25.10 ICD-9-CM: 414.01  5/1/2005 - Present Overview Signed 4/19/2011  4:21 PM by Nasir MARY  
  status post stenting of the right coronary artery History of DVT (deep venous thrombosis) in left lower extremity. ICD-10-CM: I82.409 ICD-9-CM: 453.40  8/1/2003 - Present Overview Signed 4/19/2011  4:21 PM by Dafne Ardon  
  left lower extremity Discharge Condition: Good Hospital Course: Patient with exertional angina and known CAD presented for elective cardiac catheterization. Patient is now s/p LAD PCI as noted below. Patient tolerated procedure well. Right radial site without hematoma with full peripheral pulses. Patients blood pressure has been elevated. Patient is continued on his home coreg and altace. BP elevated but improved after morning medications. Will plan to discharge home on current regimen if BP stable after morning medications. Patient not on statin given intolerance. Consults: None Significant Diagnostic Studies: angiography: Cath 10/7/2019. Diagnostic Dominance: Right Left Anterior Descending Prox LAD lesion 95% stenosed. . The pre-interventional distal flow is normal (YOSELIN 3). Prox LAD to Mid LAD lesion 65% stenosed. . The pre-interventional distal flow is normal (YOSELIN 3). Not the culprit lesion. Left Circumflex Mid Cx to Dist Cx lesion 50% stenosed. . The pre-interventional distal flow is normal (YOSELIN 3). Not the culprit lesion. Right Coronary Artery Prox RCA lesion 50% stenosed. . The pre-interventional distal flow is normal (YOSELIN 3). Not the culprit lesion. Prox RCA to Mid RCA lesion 15% stenosed. . The pre-interventional distal flow is normal (YOSELIN 3). Not the culprit lesion. The lesion was previously treated using a stent (unknown type). Previous treatment took place >2 years ago. Intervention Prox LAD lesion Stent (Also treats lesions: Prox LAD to Mid LAD) A single stent was placed. Drug-eluting stent was successfully placed. The stent used was a STENT SYS COR [id number] -- XIENCE CHRISTIANE. The strut is well apposed. Post-Intervention Lesion Assessment The intervention was successful. Post-intervention YOSELIN flow is 3. There is a 0% residual stenosis post intervention. Prox LAD to Mid LAD lesion Stent (Also treats lesions: Prox LAD) A single stent was placed. Drug-eluting stent was successfully placed. The stent used was a STENT SYS COR [id number] -- XIENCE CHRISTIANE. The strut is well apposed. Post-Intervention Lesion Assessment The intervention was successful. Post-intervention YOSELIN flow is 3. There is a 0% residual stenosis post intervention. Disposition: home Discharge Medications:  
Current Discharge Medication List  
  
START taking these medications Details  
aspirin delayed-release 81 mg tablet Take 1 Tab by mouth daily. Qty: 30 Tab, Refills: 6  
  
   
  
nitroglycerin (NITROLINGUAL) 400 mcg/spray spray 1 Spray by SubLINGual route every five (5) minutes as needed for Chest Pain. Qty: 1 Bottle, Refills: 2 CONTINUE these medications which have NOT CHANGED Details  
ramipril (ALTACE) 10 mg capsule TAKE 1 CAPSULE BY MOUTH TWICE DAILY Qty: 180 Cap, Refills: 3  
  
clopidogrel (PLAVIX) 75 mg tab TAKE ONE TABLET BY MOUTH ONCE DAILY Qty: 90 Tab, Refills: 3  
  
carvedilol (COREG) 25 mg tablet Take 1 Tab by mouth two (2) times daily (with meals). Qty: 180 Tab, Refills: 3 Associated Diagnoses: Atherosclerosis of native coronary artery of native heart without angina pectoris; Benign hypertensive heart disease without heart failure Omega-3-DHA-EPA-Fish Oil (FISH OIL) 1,000 (120-180) mg Cap Take 2 Caps by mouth two (2) times a day. alirocumab (PRALUENT PEN) 150 mg/mL injector pen 1 mL by SubCUTAneous route Once every 2 weeks. Qty: 2 Syringe, Refills: 3 ERGOCALCIFEROL, VITAMIN D2, (VITAMIN D2 PO) Take  by mouth. ALBUTEROL IN Take 1 Puff by inhalation as needed. Activity: Activity as tolerated Diet: Cardiac Diet Wound Care: As directed Follow-up Appointments Procedures  FOLLOW UP VISIT Appointment in: One Month Dr. Mike Reyes Standing Status:   Standing Number of Occurrences:   1 Order Specific Question:   Appointment in Answer:   One Month Signed By: FALLON Huffman October 8, 2019

## 2019-10-08 NOTE — DISCHARGE INSTRUCTIONS
Patient Education        A Healthy Heart: Care Instructions  Your Care Instructions    Heart disease occurs when a substance called plaque builds up in the vessels that supply oxygen-rich blood to your heart. This can narrow the blood vessels and reduce blood flow. A heart attack happens when blood flow is completely blocked. A high-fat diet, smoking, and other factors increase the risk of heart disease. Your doctor has found that you have a chance of having heart disease. You can do lots of things to keep your heart healthy. It may not be easy, but you can change your diet, exercise more, and quit smoking. These steps really work to lower your chance of heart disease. Follow-up care is a key part of your treatment and safety. Be sure to make and go to all appointments, and call your doctor if you are having problems. It's also a good idea to know your test results and keep a list of the medicines you take. How can you care for yourself at home? Diet    · Use less salt when you cook and eat. This helps lower your blood pressure. Taste food before salting. Add only a little salt when you think you need it. With time, your taste buds will adjust to less salt.     · Eat fewer snack items, fast foods, canned soups, and other high-salt, high-fat, processed foods.     · Read food labels and try to avoid saturated and trans fats. They increase your risk of heart disease by raising cholesterol levels.     · Limit the amount of solid fat-butter, margarine, and shortening-you eat. Use olive, peanut, or canola oil when you cook. Bake, broil, and steam foods instead of frying them.     · Eating fish can lower your risk for heart disease. Eat at least 2 servings of fish a week. [place], mackerel, herring, sardines, and chunk light tuna are very good choices. These fish contain omega-3 fatty acids.     · Eat a variety of fruit and vegetables every day.  Dark green, deep orange, red, or yellow fruits and vegetables are especially good for you. Examples include spinach, carrots, peaches, and berries.     · Foods high in fiber can reduce your cholesterol and provide important vitamins and minerals. High-fiber foods include whole-grain cereals and breads, oatmeal, beans, brown rice, citrus fruits, and apples.     · Limit drinks and foods with added sugar. These include candy, desserts, and soda pop.    Lifestyle changes    · If your doctor recommends it, get more exercise. Walking is a good choice. Bit by bit, increase the amount you walk every day. Try for at least 30 minutes on most days of the week. You also may want to swim, bike, or do other activities.     · Do not smoke. If you need help quitting, talk to your doctor about stop-smoking programs and medicines. These can increase your chances of quitting for good. Quitting smoking may be the most important step you can take to protect your heart. It is never too late to quit. You will get health benefits right away.     · Limit alcohol to 2 drinks a day for men and 1 drink a day for women. Too much alcohol can cause health problems. Medicines    · Take your medicines exactly as prescribed. Call your doctor if you think you are having a problem with your medicine.     · If your doctor recommends aspirin, take the amount directed each day. Make sure you take aspirin and not another kind of pain reliever, such as acetaminophen (Tylenol). If you take ibuprofen (such as Advil or Motrin) for other problems, take aspirin at least 2 hours before taking ibuprofen. When should you call for help? Call 911 if you have symptoms of a heart attack.  These may include:    · Chest pain or pressure, or a strange feeling in the chest.     · Sweating.     · Shortness of breath.     · Pain, pressure, or a strange feeling in the back, neck, jaw, or upper belly or in one or both shoulders or arms.     · Lightheadedness or sudden weakness.     · A fast or irregular heartbeat.    After you call 911, the  may tell you to chew 1 adult-strength or 2 to 4 low-dose aspirin. Wait for an ambulance. Do not try to drive yourself.   Watch closely for changes in your health, and be sure to contact your doctor if you have any problems. Where can you learn more? Go to http://zeina-mayra.info/. Enter J654 in the search box to learn more about \"A Healthy Heart: Care Instructions. \"  Current as of: April 9, 2019  Content Version: 12.2  © 2778-9678 [employer]. Care instructions adapted under license by [employer] (which disclaims liability or warranty for this information). If you have questions about a medical condition or this instruction, always ask your healthcare professional. [address] any warranty or liability for your use of this information. Patient Education        Learning About Managing Stable Angina  What is angina? Angina is a symptom of coronary artery disease, or heart disease. For most people, it feels like chest pain or pressure. Some people feel other symptoms. These symptoms include pain, pressure, or a strange feeling in the back, neck, jaw, or upper belly, or in one or both shoulders or arms. The most common types of angina are stable angina and unstable angina. · Stable angina means that you can usually predict when your symptoms will happen. You probably know what things cause your angina. · Unstable angina means that your symptoms have changed and are not following your typical pattern of stable angina. For example, you may feel angina when you are resting. Your symptoms may not go away when you try your typical ways of relieving them, such as rest or nitroglycerin. Unstable angina is an emergency. It may mean that you are having a heart attack. Most people who have stable angina can manage their symptoms.  This includes taking medicines as prescribed and knowing when to call your doctor or get help right away. It also includes paying attention to your symptoms so you can see what causes them and what is typical for you. What can you do to manage angina? Be aware of your symptoms  Tracking when and why your angina symptoms happen is one way to understand your angina better. It also helps you know what's normal for you. If you know what's normal for you, it'll be easier to tell if you have a change in symptoms that means it's time to call for help. Understanding your normal patterns may also help you make some changes that might prevent or reduce symptoms. To track your symptoms, write down:  · The day and time of day you notice symptoms. · What you were doing when you had them:  ? Exercising or doing a physical activity? ? Eating a large meal?  ? Spending time outside in very cold weather? ? Feeling a lot of stress? ? Something else? · How long the symptoms lasted. · What you did to help your symptoms. Work with your doctor  You and your doctor can use your symptoms tracking information to talk about whether you need any changes to your angina treatment. For example, you may decide to use medicine or to change your medicine. Or you may talk about other treatments you could try. Most people who have stable angina can control their symptoms by taking prescribed medicines, including nitroglycerin, when needed. Balance activity and rest  Staying active and knowing when to rest during activity are also important. Here are some tips that might help you manage your angina. · Ease into your day. · Warm up slowly before activity. Talk to your doctor about a level of activity that is safe for you. · Give yourself time to rest and digest right after meals. · Change the way you eat. Eat smaller meals more often during the day instead of two or three large meals. · If an activity causes angina, stop and rest. Be active at a level that does not cause symptoms. When should you call for help?   Call 911 anytime you think you may need emergency care. For example, call if:  · You passed out (lost consciousness). · You have symptoms of a heart attack. These may include:  ? Chest pain or pressure, or a strange feeling in the chest.  ? Sweating. ? Shortness of breath. ? Nausea or vomiting. ? Pain, pressure, or a strange feeling in the back, neck, jaw, or upper belly or in one or both shoulders or arms. ? Lightheadedness or sudden weakness. ? A fast or irregular heartbeat. After you call 911, the  may tell you to chew 1 adult-strength or 2 to 4 low-dose aspirin. Wait for an ambulance. Do not try to drive yourself. · You have angina symptoms that do not go away with rest or are not getting better within 5 minutes after you take a dose of nitroglycerin. Call your doctor now or seek immediate medical care if:  · You are having angina symptoms, such as chest pain or pressure, more often than usual, or they are different or worse than usual.  · You feel dizzy or lightheaded, or you feel like you may faint. Watch closely for changes in your health, and be sure to contact your doctor if you have any problems. Follow-up care is a key part of your treatment and safety. Be sure to make and go to all appointments, and call your doctor if you are having problems. It's also a good idea to know your test results and keep a list of the medicines you take. Where can you learn more? Go to http://zeina-mayra.info/. Enter 96 [id number] in the search box to learn more about \"Learning About Managing Stable Angina. \"  Current as of: April 9, 2019  Content Version: 12.2  © 5379-5048 [employer]. Care instructions adapted under license by [employer] (which disclaims liability or warranty for this information).  If you have questions about a medical condition or this instruction, always ask your healthcare professional. [address] any warranty or liability for your use of this information. General Discharge Instructions    Patient ID:  Ian Huggins  [id number]  86 y.o.  1940    The following personal items were collected during your admission and were returned to you. Valuables Screen  Clothing: At bedside  Computer: None  Dental Appliances: With patient  Home Medications: Kept at bedside  Jewelry: Ring, Watch, With patient  Luggage with Personal Belongings: At bedside  Other Valuables: Cell Phone  Valuables Given To: Patient        Take Home Medications           What to do at Home    Recommended diet: Regular Diet,     Recommended activity: No lifting over 10lbs or Strenuous exercise until after cardiac follow-up. If you experience any of the following symptoms chest pain/pressure or SOB, please follow up with 911. Information obtained by :  I understand that if any problems occur once I am at home I am to contact my physician. I understand and acknowledge receipt of the instructions indicated above. Physician's or R.N.'s Signature                                                                  Date/Time                                                                                                                                              Patient or Representative Signature                                                          Date/Time    I have reviewed discharge instructions (including next dose due) with the patient. The patient verbalized understanding.     Patient armband removed and shredded

## 2019-10-08 NOTE — PROGRESS NOTES
Reason for Admission:  Coronary atherosclerosis of native coronary artery [I25.10] Angina decubitus ([facility] [address] [I20.8] CAD (coronary artery disease) [I25.10] CAD (coronary artery disease) [I25.10] RRAT Score:    10 Plan for utilizing home health:    no   
                 
Likelihood of Readmission:   LOW Transition of Care Plan:         
 
 
Initial assessment completed with patient. Cognitive status of patient: oriented to time, place, person and situation. Face sheet information confirmed:  yes. The patient designates spousePatricia to participate in his discharge plan and to receive any needed information. This patient lives in a single family home with patient and spouse. Patient is able to navigate steps as needed. Prior to hospitalization, patient was considered to be independent with ADLs/IADLS : yes . Patient has a current ACP document on file: no The patient and spouse will be available to transport patient home upon discharge. The patient already has none reported, and  medical equipment available in the home. Patient is not currently active with home health. Patient has not stayed in a skilled nursing facility or rehab. This patient is on dialysis :no 
 
 Freedom of choice signed: no. Currently, the discharge plan is Home. The patient states that he can obtain his medications from the pharmacy, and take his medications as directed. Patient's current insurance is [facility] [place] Care Management Interventions PCP Verified by CM: Yes Mode of Transport at Discharge: Self Transition of Care Consult (CM Consult): Discharge Planning Discharge Durable Medical Equipment: No 
Physical Therapy Consult: No 
Occupational Therapy Consult: No 
Speech Therapy Consult: No 
Confirm Follow Up Transport: Family Plan discussed with Pt/Family/Caregiver: Yes Discharge Location Discharge Placement: [place], [facility] [place] [phone]

## 2019-11-21 NOTE — PROGRESS NOTES
Patient: Dionne Kim                MRN: [id number]       SSN: [id number] YOB: 1940        AGE: 70 y.o. SEX: male PCP: Joseph Post MD 
11/21/19 CC: LEFT KNEE AND BILATERAL ELBOW PAIN 
 
HISTORY:  Dionne Kim is a 70 y.o. male who is seen for increased left knee pain and swelling. He has a long h/o left knee pain related to an old softball injury at age 23. He caught his spikes and twisted his knee while sliding into 3rd base in 1965. He felt his knee pop when his foot caught the ground and his body kept going. He is able to live with his hip pain but is having more trouble with his knee. He experiences right knee pain when walking. He is also seen for bilateral elbow pain. He plans to see a rheumatologist in January for his elbow pain. He was previously seen for right hip pain. He had significant relief from prior hip and knee cortisone injections. He has been experiencing right hip pain for many years. He reports right buttock lower back pain. He has a h/o severe psoriasis that he has been battling for years with a variety of medications. Pain Assessment  11/21/2019 Location of Pain Knee Location Modifiers Left Severity of Pain 8 Quality of Pain Aching; Gerber Leidy Duration of Pain Persistent Frequency of Pain Intermittent Aggravating Factors Walking;Standing;Bending Limiting Behavior Yes Relieving Factors Rest  
Result of Injury No  
 
Occupation, etc:  Mr. Moe Lou he retired in 2013 as a  for [employer]. His son and 3 grand children and other family that live in [place]. He lives with his wife in [place]. He tries to stay active with house and yard work. He has some heart problems. He has a puppy--half lhasa opso and half cocker spaniel named [place] He grew up in [place]. Current weight is 205 pounds.     
 
No results found for: HBA1C, HGBE8, NJJ9YBHO, KCL0JVJD, NAQ3GWJY 
 Weight Metrics 11/21/2019 10/8/2019 10/7/2019 10/2/2019 4/5/2019 4/3/2019 1/15/2019 Weight 205 lb 3.2 oz 203 lb - 210 lb 216 lb 12.8 oz 219 lb 216 lb 12.8 oz  
BMI 27.07 kg/m2 - 26.78 kg/m2 27.71 kg/m2 28.6 kg/m2 28.89 kg/m2 28.6 kg/m2 Patient Active Problem List  
Diagnosis Code  Coronary artery disease, status post stenting of the RCA in May 2005/EF 50%. I25.10  Hypertension  I11.9  Dyslipidemia E78.5  History of right nephrectomy due to renal cell carcinoma in 1998. Z90.5  Mild renal insufficiency N28.9  History of DVT (deep venous thrombosis) in left lower extremity. I82.409  Myalgia, related to statin. M79.10  DESAI (dyspnea on exertion) A36.92  
 Diastolic dysfunction, left ventricle I51.9  
 CAD (coronary artery disease) I25.10 REVIEW OF SYSTEMS: All Below are Negative except: See HPI Constitutional: negative for fever, chills, and weight loss. Cardiovascular: negative for chest pain, claudication, leg swelling, SOB, DESAI Gastrointestinal: Negative for pain, N/V/C/D, Blood in stool or urine, dysuria,  hematuria, incontinence, pelvic pain. Musculoskeletal: See HPI Neurological: Negative for dizziness and weakness. Negative for headaches, Visual changes, confusion, seizures Phychiatric/Behavioral: Negative for depression, memory loss, substance  abuse. Extremities: Negative for hair changes, rash, or skin lesion changes. Hematologic: Negative for bleeding problems, bruising, pallor or swollen lymph  nodes Peripheral Vascular: No calf pain, no circulation deficits. Social History Socioeconomic History  Marital status:  Spouse name: Not on file  Number of children: Not on file  Years of education: Not on file  Highest education level: Not on file Occupational History  Not on file Social Needs  Financial resource strain: Not on file  Food insecurity:  
  Worry: Not on file Inability: Not on file  Transportation needs:  
  Medical: Not on file Non-medical: Not on file Tobacco Use  Smoking status: Former Smoker Years: 40.00 Types: Pipe Last attempt to quit: 2008 Years since quittin.5  Smokeless tobacco: Never Used Substance and Sexual Activity  Alcohol use: No  
 Drug use: No  
 Sexual activity: Not on file Lifestyle  Physical activity:  
  Days per week: Not on file Minutes per session: Not on file  Stress: Not on file Relationships  Social connections:  
  Talks on phone: Not on file Gets together: Not on file Attends Mandaeism service: Not on file Active member of club or organization: Not on file Attends meetings of clubs or organizations: Not on file Relationship status: Not on file  Intimate partner violence:  
  Fear of current or ex partner: Not on file Emotionally abused: Not on file Physically abused: Not on file Forced sexual activity: Not on file Other Topics Concern  Not on file Social History Narrative  Not on file Allergies Allergen Reactions  Lipitor [Atorvastatin] Myalgia  Morphine Other (comments) Low blood pressure  Niaspan [Niacin] Not Reported This Time  Statins-Hmg-Coa Reductase Inhibitors Myalgia  Zetia [Ezetimibe] Myalgia Current Outpatient Medications Medication Sig  colchicine 0.6 mg tablet TAKE 1 TABLET BY MOUTH TWICE DAILY AS NEEDED  
 triamcinolone acetonide (KENALOG) 0.1 % topical cream APPLY 1 APPLICATION TO THE ARMS LEGS AND TORSO TWICE DAILY AS NEEDED  
 risankizumab-rzaa (SKYRIZI) 75 mg/0.83 mL syrg by SubCUTAneous route. Indications: skyrizi  hydrALAZINE (APRESOLINE) 25 mg tablet Take 1 Tab by mouth three (3) times daily.  isosorbide mononitrate ER (IMDUR) 30 mg tablet Take 1 Tab by mouth every morning.  ramipril (ALTACE) 10 mg capsule TAKE 1 CAPSULE BY MOUTH TWICE DAILY  clopidogrel (PLAVIX) 75 mg tab TAKE ONE TABLET BY MOUTH ONCE DAILY  carvedilol (COREG) 25 mg tablet Take 1 Tab by mouth two (2) times daily (with meals).  ERGOCALCIFEROL, VITAMIN D2, (VITAMIN D2 PO) Take  by mouth.  Omega-3-DHA-EPA-Fish Oil (FISH OIL) 1,000 (120-180) mg Cap Take 2 Caps by mouth two (2) times a day.  ALBUTEROL IN Take 1 Puff by inhalation as needed.  aspirin delayed-release 81 mg tablet Take 1 Tab by mouth daily.  nitroglycerin (NITROLINGUAL) 400 mcg/spray spray 1 Spray by SubLINGual route every five (5) minutes as needed for Chest Pain.  alirocumab (PRALUENT PEN) 150 mg/mL injector pen 1 mL by SubCUTAneous route Once every 2 weeks. (Patient taking differently: 150 mg by SubCUTAneous route Once every 2 weeks. Indications: Patient decided not to take this med) No current facility-administered medications for this visit. PHYSICAL EXAMINATION: 
Visit Vitals /71 Pulse 81 Temp 96.7 °F (35.9 °C) (Oral) Resp 18 Ht 6' 1\" (1.854 m) Wt 205 lb 3.2 oz (93.1 kg) SpO2 99% BMI 27.07 kg/m² ORTHO EXAMINATION: 
Examination Right knee Left knee Skin Intact Psoriatic lesions and discoloration from venous stasis Range of motion 120-0 90-5 Effusion ++ ++ Medial joint line tenderness + + Lateral joint line tenderness - - Popliteal tenderness - -  
Osteophytes palpable - + Rivers - - Patella crepitus - - Anterior drawer - - Lateral laxity - - Medial laxity - - Varus deformity - -  
Valgus deformity - - Pretibial edema - +2 Calf tenderness - - Examination Lumbar Thoracic Skin Psoriatic lesions  Intact Tenderness + - Tightness - - Lordosis Normal N/A Kyphosis N/A Normal  
Scoliosis - - Flexion Fingertips to mid shin N/A Extension 10 N/A Knee reflexes Normal N/A Ankle reflexes Normal N/A Straight leg raise - N/A Calf tenderness - N/A Large red scaly areas over trunk and extremities Small abrasion of left hand from playing with new puppy Examination Right hip Skin Psoriatic lesions External Rotation ROM 10 Internal Rotation ROM 0 Trochanteric tenderness + posterior Hip flexion contracture - Antalgic gait - Trendelenberg sign - Lumbar tenderness - Straight leg raise - Calf tenderness - Neurovascular Intact Examination Right Elbow Left Elbow Skin Intact Intact Range of Motion 125-0 125-0 Tenderness + medial epicondyle + medial epicondyle Swelling - - Bruising - - Stability Normal Normal  
Motor Strength  Normal Normal  
Neurovascular Intact Intact Chart reviewed for the following: 
 Laurel Lemus MD, have reviewed the History, Physical and updated the Allergic reactions for Bryanna Glaze TIME OUT performed immediately prior to start of procedure: 
Laurel Lemus MD, have performed the following reviews on Bryanna Glaze prior to the start of the procedure: 
* Patient was identified by name and date of birth * Agreement on procedure being performed was verified * Risks and Benefits explained to the patient * Procedure site verified and marked as necessary * Patient was positioned for comfort * Consent was obtained Time: 10:33 AM 
 
Date of procedure: 11/21/2019 Procedure performed by:  Silvia Estrada MD 
Mr. Alma Borden tolerated the procedure well with no complications. RADIOGRAPHS: 
XR LEFT KNEE 1/15/19 BA[place]IST[place] IMPRESSION:  Three views - No fractures, no effusion, moderately severe joint space narrowing, + osteophytes present. IKDC Grade C 
 
XR RIGHT HIP 1/15/19 [place]ISTA IMPRESSION:  AP pelvis and two views - No fractures, moderate joint space narrowing, + osteophytes present. [place]nis grade 2 IMPRESSION:   
  ICD-10-CM ICD-9-CM 1. Primary osteoarthritis of left knee M17.12 715.16 betamethasone (CELESTONE SOLUSPAN) 6 mg/mL injection    BETAMETHASONE ACETATE & SODIUM PHOSPHATE INJECTION 3 MG EA.  
 DRAIN/INJECT LARGE JOINT/BURSA PROCEDURE AUTHORIZATION TO   
2. Chronic pain of left knee M25.562 719.46 betamethasone (CELESTONE SOLUSPAN) 6 mg/mL injection G89.29 338.29 BETAMETHASONE ACETATE & SODIUM PHOSPHATE INJECTION 3 MG EA.  
   DRAIN/INJECT LARGE JOINT/BURSA PROCEDURE AUTHORIZATION TO  3. Medial epicondylitis of both elbows M77.01 726.31   
 M77.02    
4. [facility] L40.50 696.0 PLAN:  After discussing treatment options, patient's left knee was injected with 4 cc Marcaine and 1/2 cc Celestone. Consider visco supplementation if pain continues. Consider aspiration next OV. There is no need for surgery at this time. He will follow up as needed.  
 
Scribed by Maria Luz Beltran ([address]) as dictated by Theresa Da Silva MD

## 2019-11-21 NOTE — PROGRESS NOTES
Verbal order given by Dr. Alison Stafford to draw up 4 mL 0.25% Sensorcaine and 0.5 mL 30 mg/5mL Betamethasone.

## 2019-11-30 NOTE — PROGRESS NOTES
Jayant Staley presents today for a post-cardiac catheterization follow-up. He was seen by Dr. Sixto Ibrahim on 10/2/19 and during that visit, he complained of exertional chest pain that had been occurring for several weeks. He was started on nitrates and he recommended proceeding with cardiac catheterization instead of stress testing in view of his history of previous stenting to the RCA and 50% stenosis in the mid LAD. The cath done on 10/7/19 showed: · Mild disease involving the dominant right immediately proximal to previous stent which is widely patent. · 50% distal circumflex bifurcation lesion with normal flow. · Proximal/mid LAD 95% stenosis followed by a flap dissection with normal flow through the dissection segment followed by 65% stenosis. · Overall normal LV function with EF of 60% with mild hypokinesis involving the distal inferior wall. The long LAD lesion from 95% through the dissection flap to the 65% was stented using 38 mm DAJA. Postdilatation was performed using 3 mm noncompliant balloon. Residual stenosis was 0%. He is a 70year old male with history of CAD (s/p PCI/stent to RCA in May 2005) and nephrectomy. He was last seen by Dr. Sixto Ibrahim on 10/2/19. Since being discharged home, he states that he has been feeling well. Denies chest pain, tightness, heaviness, and palpitations. Denies shortness of breath at rest, dyspnea on exertion, orthopnea and PND. Denies abdominal bloating. Denies lightheadedness, dizziness, and syncope. Admits to some mild lower extremity edema and denies claudication. Denies nausea, vomiting, diarrhea, melena, hematochezia. Denies hematuria, urgency, frequency. Denies fever, chills. While reviewing his medication list, he informed that he stopped taking his aspirin as he states that this can cause gastric bleeding. He took it for several days after being discharged home but then discontinued it. PMH: 
Past Medical History: Diagnosis Date  Asthma  Cardiac catheterization 05/27/2005 LM patent. mLAD 50%. Cx mild. OM3 55%. mRCA 95% (Cypher 3.5 x 18 DAJA). EF 50%. Patent left renal.  
 Cardiac Holter monitor, normal 06/21/2010 Benign Holter study.  Cardiac nuclear imaging test, low risk 06/01/2016 Low risk. No ischemia or prior infarction. No WMA. EF 68%. Neg EKG on pharm stress test.  
 Coronary artery disease May 2005  
 status post stenting of the right coronary artery  DVT (deep venous thrombosis) [facility]) August 2003  
 left lower extremity  Dyslipidemia  History of nephrectomy 1998  
 right, secondary to cancer  History of vasectomy  Hypertension  Renal insufficiency   
 mild, with a creatinine of 1.6 prior to cardiac catheterization in May 2005 PSH: 
Past Surgical History:  
Procedure Laterality Date  HX CORONARY STENT PLACEMENT  05/27/05 RCA with 3.5 x 18-mm Cypher stent  HX HEART CATHETERIZATION  05/27/05 [address]  
 right  HX VASECTOMY MEDS: 
Current Outpatient Medications Medication Sig  colchicine 0.6 mg tablet TAKE 1 TABLET BY MOUTH TWICE DAILY AS NEEDED  
 triamcinolone acetonide (KENALOG) 0.1 % topical cream APPLY 1 APPLICATION TO THE ARMS LEGS AND TORSO TWICE DAILY AS NEEDED  
 risankizumab-rzaa (SKYRIZI) 75 mg/0.83 mL syrg by SubCUTAneous route. Indications: skyrizi  nitroglycerin (NITROLINGUAL) 400 mcg/spray spray 1 Spray by SubLINGual route every five (5) minutes as needed for Chest Pain.  hydrALAZINE (APRESOLINE) 25 mg tablet Take 1 Tab by mouth three (3) times daily. (Patient taking differently: Take 25 mg by mouth two (2) times a day.)  ramipril (ALTACE) 10 mg capsule TAKE 1 CAPSULE BY MOUTH TWICE DAILY  clopidogrel (PLAVIX) 75 mg tab TAKE ONE TABLET BY MOUTH ONCE DAILY  carvedilol (COREG) 25 mg tablet Take 1 Tab by mouth two (2) times daily (with meals).  alirocumab (PRALUENT PEN) 150 mg/mL injector pen 1 mL by SubCUTAneous route Once every 2 weeks. (Patient taking differently: 150 mg by SubCUTAneous route Once every 2 weeks. Indications: Patient decided not to take this med)  ERGOCALCIFEROL, VITAMIN D2, (VITAMIN D2 PO) Take  by mouth.  Omega-3-DHA-EPA-Fish Oil (FISH OIL) 1,000 (120-180) mg Cap Take 2 Caps by mouth two (2) times a day.  ALBUTEROL IN Take 1 Puff by inhalation as needed.  aspirin delayed-release 81 mg tablet Take 1 Tab by mouth daily. No current facility-administered medications for this visit. Allergies and Sensitivities: 
Allergies Allergen Reactions  Lipitor [Atorvastatin] Myalgia  Morphine Other (comments) Low blood pressure  Niaspan [Niacin] Not Reported This Time  Statins-Hmg-Coa Reductase Inhibitors Myalgia  Zetia [Ezetimibe] Myalgia Family History: 
Family History Problem Relation Age of Onset  Heart Attack Mother  at age 79 from MI. She also had enlarged heart and asthma.  Hypertension Mother  Cancer Father Liver cancer Social History: He  reports that he quit smoking about 11 years ago. His smoking use included pipe. He quit after 40.00 years of use. He has never used smokeless tobacco.  He  reports no history of alcohol use. Physical: 
Visit Vitals /70 Pulse 78 Ht 6' 1\" (1.854 m) Wt 90.7 kg (200 lb) SpO2 98% BMI 26.39 kg/m² Exam: 
Neck:  Supple, no JVD, no carotid bruits CV:  Normal S1 and  S2, no murmurs, rubs, or gallops noted Lungs:  Clear to ausculation throughout, no wheezes or rales Abd:  Soft, non-tender, non-distended with good bowel sounds. No hepatosplenomegaly Extremities:  No edema Data: 
EKG: 
 
 
LABS: 
Lab Results Component Value Date/Time  Sodium 139 10/03/2019 11:02 AM  
 Potassium 4.9 10/03/2019 11:02 AM  
 Chloride 107 10/03/2019 11:02 AM  
 CO2 28 10/03/2019 11:02 AM  
 Glucose 158 (H) 10/03/2019 11:02 AM  
 BUN 21 (H) 10/03/2019 11:02 AM  
 Creatinine 1.39 (H) 10/03/2019 11:02 AM  
 
Lab Results Component Value Date/Time Cholesterol, total 217 (H) 03/08/2018 10:03 AM  
 HDL Cholesterol 36 (L) 03/08/2018 10:03 AM  
 LDL, calculated 126.2 (H) 03/08/2018 10:03 AM  
 Triglyceride 274 (H) 03/08/2018 10:03 AM  
 CHOL/HDL Ratio 6.0 (H) 03/08/2018 10:03 AM  
 
Lab Results Component Value Date/Time ALT (SGPT) 13 (L) 10/03/2019 11:02 AM  
 
 
 
Impression/Plan: 1.  CAD, s/p PCI/stent to the RCA in 2005 and s/p recent PCI/stent to a proximal/mid LAD 95% stenosis, followed by flap dissection and 65% stenosis 2. History of nephrectomy 3. Intolerant to statins Mr. Alexandre Nunez was seen today for a post-cath follow-up. He states that he has been feeling well since being discharged home. He has not had any episodes of chest pain or shortness of breath. He is compliant with his Plavix but states he discontinued the aspirin a few days after being discharged home. He states that he is aware that the aspirin can cause gastric bleeding and that is why he stopped taking it. The importance of dual antiplatelet therapy was discussed at length with him. He states he will restart the aspirin 81 mg daily. I asked that he call the office if he has any stomach upset or notices black stools or bleeding. He was asked to take the Plavix without fail. He will follow-up with Dr She Amaya as scheduled and as needed. Erika Casiano MSN, FNP-BC Please note:  Portions of this chart were created with Dragon medical speech to text program.  Unrecognized errors may be present.

## 2019-12-05 NOTE — PROGRESS NOTES
Sunil Chahal presents today for Chief Complaint Patient presents with  Coronary Artery Disease S[facility]P [facility]  Leg Swelling  
  left leg Sunil Chahal preferred language for health care discussion is english/other. Is someone accompanying this pt? no 
 
Is the patient using any DME equipment during OV? no 
 
Depression Screening: 
3 most recent PHQ Screens 11/21/2019 PHQ Not Done Patient Decline Little interest or pleasure in doing things - Feeling down, depressed, irritable, or hopeless - Total Score PHQ 2 - Learning Assessment: 
Learning Assessment 3/7/2018 PRIMARY LEARNER Patient HIGHEST LEVEL OF EDUCATION - PRIMARY LEARNER  -  
BARRIERS PRIMARY LEARNER -  
CO-LEARNER CAREGIVER -  
PRIMARY LANGUAGE ENGLISH  NEED -  
LEARNER PREFERENCE PRIMARY DEMONSTRATION  
ANSWERED BY Patient RELATIONSHIP SELF Abuse Screening: No flowsheet data found. Fall Risk Fall Risk Assessment, last 12 mths 11/21/2019 Able to walk? Yes Fall in past 12 months? No  
 
 
Pt currently taking Anticoagulant therapy? yes Coordination of Care: 1. Have you been to the ER, urgent care clinic since your last visit? Hospitalized since your last visit? yes 2. Have you seen or consulted any other health care providers outside of the [address] [place] [address] since your last visit? Include any pap smears or colon screening.  no

## 2019-12-05 NOTE — PATIENT INSTRUCTIONS
Resume Aspirin 81mg daily. Call the office if you develop stomach upset or notice black stools or bleeding Take clopidogrel (Plavix) 75mg daily without fail All other medications to remain the same Follow-up with Dr. Loren Ruiz as scheduled and as needed

## 2019-12-16 NOTE — ED PROVIDER NOTES
EMERGENCY DEPARTMENT HISTORY AND PHYSICAL EXAM 
 
3:41 PM 
 
 
Date: 12/16/2019 Patient Name: Andrei Fernandez History of Presenting Illness Chief Complaint Patient presents with  Shortness of Breath  Chest Pain History Provided By: Patient Additional History (Context): Andrei Fernandez is a 70 y.o. male with Past medical history of asthma, coronary artery disease, hypertension who presents with chief complaint of cough for the past 10 days. Associated symptom is chest discomfort, and shortness of breath. He states that the cough was productive with whitish to clear sputum. He denies any radiating chest pain, jaw pain, sweating, leg swelling or leg pain, nausea, sweating, back pain, dizziness, fever, and no other complaint. PCP: Shad Naranjo MD 
 
 
 
Past History Past Medical History: 
Past Medical History:  
Diagnosis Date  Asthma  Cardiac catheterization 05/27/2005 LM patent. mLAD 50%. Cx mild. OM3 55%. mRCA 95% (Cypher 3.5 x 18 DAJA). EF 50%. Patent left renal.  
 Cardiac Holter monitor, normal 06/21/2010 Benign Holter study.  Cardiac nuclear imaging test, low risk 06/01/2016 Low risk. No ischemia or prior infarction. No WMA. EF 68%. Neg EKG on pharm stress test.  
 Coronary artery disease May 2005  
 status post stenting of the right coronary artery  DVT (deep venous thrombosis) [facility]) August 2003  
 left lower extremity  Dyslipidemia  History of nephrectomy 1998  
 right, secondary to cancer  History of vasectomy  Hypertension  Renal insufficiency   
 mild, with a creatinine of 1.6 prior to cardiac catheterization in May 2005 Past Surgical History: 
Past Surgical History:  
Procedure Laterality Date  HX CORONARY STENT PLACEMENT  05/27/05 RCA with 3.5 x 18-mm Cypher stent  HX HEART CATHETERIZATION  05/27/05 [address]  
 right  HX VASECTOMY Family History: 
Family History Problem Relation Age of Onset  Heart Attack Mother  at age 79 from MI. She also had enlarged heart and asthma.  Hypertension Mother  Cancer Father Liver cancer Social History: 
Social History Tobacco Use  Smoking status: Former Smoker Years: 40.00 Types: Pipe Last attempt to quit: 2008 Years since quittin.6  Smokeless tobacco: Never Used Substance Use Topics  Alcohol use: No  
 Drug use: No  
 
 
Allergies: Allergies Allergen Reactions  Lipitor [Atorvastatin] Myalgia  Morphine Other (comments) Low blood pressure  Niaspan [Niacin] Not Reported This Time  Statins-Hmg-Coa Reductase Inhibitors Myalgia  Zetia [Ezetimibe] Myalgia Review of Systems Review of Systems Constitutional: Negative for chills and fever. HENT: Negative for rhinorrhea, sore throat and trouble swallowing. Recent productive cough over the past 2 weeks but lessening some now Eyes: Negative for visual disturbance. Respiratory: Positive for cough, shortness of breath and wheezing. Cardiovascular: Positive for chest pain. Negative for palpitations and leg swelling. Gastrointestinal: Negative for abdominal pain, nausea and vomiting. Endocrine: Negative for polyuria. Genitourinary: Negative for difficulty urinating and dysuria. Musculoskeletal: Negative for arthralgias and neck stiffness. Skin: Negative for rash. Neurological: Negative for dizziness, weakness, numbness and headaches. Hematological: Does not bruise/bleed easily. Psychiatric/Behavioral: Negative for confusion and dysphoric mood. All other systems reviewed and are negative. Physical Exam  
 
Visit Vitals /70 Pulse 96 Temp 98.4 °F (36.9 °C) Resp 23 Ht 6' 1\" (1.854 m) Wt 86.2 kg (190 lb) SpO2 97% BMI 25.07 kg/m² Physical Exam 
Vitals signs and nursing note reviewed. Constitutional: General: He is not in acute distress. Appearance: He is well-developed. He is not ill-appearing, toxic-appearing or diaphoretic. HENT:  
   Head: Normocephalic and atraumatic. Nose: Nose normal.  
   Mouth/Throat:  
   Mouth: Mucous membranes are moist.  
   Pharynx: No pharyngeal swelling or oropharyngeal exudate. Eyes:  
   General: No scleral icterus. Conjunctiva/sclera: Conjunctivae normal.  
   Pupils: Pupils are equal, round, and reactive to light. Neck: Musculoskeletal: Normal range of motion and neck supple. No muscular tenderness. Vascular: No carotid bruit or JVD. Cardiovascular:  
   Rate and Rhythm: Normal rate and regular rhythm. Heart sounds: Normal heart sounds. No friction rub. No gallop. Comments: Capillary refill < 3 seconds Pulmonary:  
   Effort: Pulmonary effort is normal. No respiratory distress. Breath sounds: Examination of the right-lower field reveals rhonchi. Examination of the left-lower field reveals rhonchi. Rhonchi present. No wheezing or rales. Chest:  
   Chest wall: No tenderness. Abdominal:  
   General: Bowel sounds are normal. There is no distension. Palpations: Abdomen is soft. Tenderness: There is no tenderness. Musculoskeletal: Normal range of motion. Right lower leg: He exhibits no tenderness. No edema. Left lower leg: He exhibits no tenderness. No edema. Lymphadenopathy:  
   Cervical: No cervical adenopathy. Skin: 
   General: Skin is warm and dry. Coloration: Skin is not pale. Findings: No erythema. Neurological:  
   General: No focal deficit present. Mental Status: He is alert and oriented to person, place, and time. Cranial Nerves: No cranial nerve deficit. Sensory: No sensory deficit. Motor: No weakness.   
   Coordination: Coordination normal.  
Psychiatric:     
   Mood and Affect: Mood normal.     
   Behavior: Behavior normal.  
 
 
 
 
 Diagnostic Study Results Labs - Recent Results (from the past 12 hour(s)) EKG, 12 LEAD, INITIAL Collection Time: 12/16/19  3:33 PM  
Result Value Ref Range Ventricular Rate 96 BPM  
 Atrial Rate 96 BPM  
 P-R Interval 208 ms QRS Duration 96 ms  
 Q-T Interval 320 ms QTC Calculation (Bezet) 404 ms Calculated P Axis 77 degrees Calculated R Axis 85 degrees Calculated T Axis 63 degrees Diagnosis Normal sinus rhythm Normal ECG When compared with ECG of 08-OCT-2019 06:50, No significant change was found Confirmed by Angela Becker MD, --- ([id number]) on 12/16/2019 3:40:07 PM 
  
CBC WITH AUTOMATED DIFF Collection Time: 12/16/19  3:50 PM  
Result Value Ref Range WBC 7.7 4.6 - 13.2 K/uL  
 RBC 3.89 (L) 4.70 - 5.50 M/uL  
 HGB 10.2 (L) 13.0 - 16.0 g/dL HCT 31.9 (L) 36.0 - 48.0 % MCV 82.0 74.0 - 97.0 FL  
 MCH 26.2 24.0 - 34.0 PG  
 MCHC 32.0 31.0 - 37.0 g/dL  
 RDW 14.3 11.6 - 14.5 % PLATELET 904 409 - 066 K/uL MPV 10.0 9.2 - 11.8 FL  
 NEUTROPHILS 80 (H) 40 - 73 % LYMPHOCYTES 11 (L) 21 - 52 % MONOCYTES 6 3 - 10 % EOSINOPHILS 3 0 - 5 % BASOPHILS 0 0 - 2 %  
 ABS. NEUTROPHILS 6.2 1.8 - 8.0 K/UL  
 ABS. LYMPHOCYTES 0.8 (L) 0.9 - 3.6 K/UL  
 ABS. MONOCYTES 0.5 0.05 - 1.2 K/UL  
 ABS. EOSINOPHILS 0.2 0.0 - 0.4 K/UL  
 ABS. BASOPHILS 0.0 0.0 - 0.1 K/UL  
 DF AUTOMATED METABOLIC PANEL, BASIC Collection Time: 12/16/19  3:50 PM  
Result Value Ref Range Sodium 136 136 - 145 mmol/L Potassium 5.2 3.5 - 5.5 mmol/L Chloride 104 100 - 111 mmol/L  
 CO2 23 21 - 32 mmol/L Anion gap 9 3.0 - 18 mmol/L Glucose 275 (H) 74 - 99 mg/dL BUN 33 (H) 7.0 - 18 MG/DL Creatinine 1.64 (H) 0.6 - 1.3 MG/DL  
 BUN/Creatinine ratio 20 12 - 20 GFR est AA 50 (L) >60 ml/min/1.73m2 GFR est non-AA 41 (L) >60 ml/min/1.73m2 Calcium 9.0 8.5 - 10.1 MG/DL  
CARDIAC PANEL,(CK, CKMB & TROPONIN) Collection Time: 12/16/19  3:50 PM  
Result Value Ref Range CK 92 39 - 308 U/L  
 CK - MB 1.3 <3.6 ng/ml CK-MB Index 1.4 0.0 - 4.0 % Troponin-I, QT <0.02 0.0 - 0.045 NG/ML  
NT-PRO BNP Collection Time: 12/16/19  3:50 PM  
Result Value Ref Range NT pro- 0 - 1,800 PG/ML  
URINALYSIS W/ RFLX MICROSCOPIC Collection Time: 12/16/19  5:16 PM  
Result Value Ref Range Color YELLOW Appearance CLEAR Specific gravity 1.019 1.005 - 1.030    
 pH (UA) 5.5 5.0 - 8.0 Protein 300 (A) NEG mg/dL Glucose 500 (A) NEG mg/dL Ketone NEGATIVE  NEG mg/dL Bilirubin NEGATIVE  NEG Blood TRACE (A) NEG Urobilinogen 1.0 0.2 - 1.0 EU/dL Nitrites NEGATIVE  NEG Leukocyte Esterase NEGATIVE  NEG    
URINE MICROSCOPIC ONLY Collection Time: 12/16/19  5:16 PM  
Result Value Ref Range WBC 0 to 3 0 - 4 /hpf  
 RBC 0 to 3 0 - 5 /hpf Epithelial cells 1+ 0 - 5 /lpf  
TROPONIN I Collection Time: 12/16/19  6:23 PM  
Result Value Ref Range Troponin-I, QT <0.02 0.0 - 0.045 NG/ML Radiologic Studies -  
XR CHEST PA LAT Final Result IMPRESSION:  
  
Stable chest with no acute process. Medical Decision Making I am the first provider for this patient. I reviewed the vital signs, available nursing notes, past medical history, past surgical history, family history and social history. Vital Signs-Reviewed the patient's vital signs. Records Reviewed: Nursing Notes and Old Medical Records (Time of Review: 3:41 PM) Provider Notes (Medical Decision Making): DDX: Acute bronchitis, URI, pneumonia, CHF, cardiac, musculoskeletal, metabolic, GERD Labs, EKG, chest x-ray, neb treatment MDM Medications  
albuterol (PROVENTIL VENTOLIN) nebulizer solution 2.5 mg (2.5 mg Nebulization Given 12/16/19 1648) ED Course: Progress Notes, Reevaluation, and Consults: 
No alarming labs Initial troponin less than 0.02 Reassessed patient after neb treatment, states feeling better If troponin repeat is negative will discharge home with Z-Diana, albuterol inhaler, Tessalon Perles, and have him follow-up PCP and also his cardiologist 
 
Repeat troponin negative I have reassessed the patient. I have discussed the workup, results and plan with the patient and patient is in agreement. Patient is feeling her. Patient was discharge in stable condition. Patient was given outpatient follow up. Patient is to return to emergency department if any new or worsening condition. Diagnosis Clinical Impression: 1. Acute bronchitis, unspecified organism 2. Atypical chest pain Disposition: Discharged Follow-up Information Follow up With Specialties Details Why Contact Info Sue Pena MD Family Practice Schedule an appointment as soon as possible for a visit in 2 days  [address] [address] 
[phone] Charles Jones MD Cardiology Schedule an appointment as soon as possible for a visit in 2 days  [address] [place] [address] [phone] [facility] 
[phone] [id number] [facility] EMERGENCY DEPT Emergency Medicine  As needed, If symptoms worsen [address] [place] [id number] [phone] Patient's Medications Start Taking ALBUTEROL (PROVENTIL HFA, VENTOLIN HFA, PROAIR HFA) 90 MCG/ACTUATION INHALER    Take 2 Puffs by inhalation every four (4) hours as needed for Wheezing or Shortness of Breath. Indications: Asthma Attack AZITHROMYCIN (ZITHROMAX Z-DIANA) 250 MG TABLET    Take as written BENZONATATE (TESSALON PERLES) 100 MG CAPSULE    Take 2 Caps by mouth three (3) times daily as needed for Cough for up to 7 days. Continue Taking ALIROCUMAB (PRALUENT PEN) 150 MG/ML INJECTOR PEN    1 mL by SubCUTAneous route Once every 2 weeks. ASPIRIN DELAYED-RELEASE 81 MG TABLET    Take 1 Tab by mouth daily. CARVEDILOL (COREG) 25 MG TABLET    Take 1 Tab by mouth two (2) times daily (with meals). CLOPIDOGREL (PLAVIX) 75 MG TAB    TAKE ONE TABLET BY MOUTH ONCE DAILY COLCHICINE 0.6 MG TABLET    TAKE 1 TABLET BY MOUTH TWICE DAILY AS NEEDED  
 ERGOCALCIFEROL, VITAMIN D2, (VITAMIN D2 PO)    Take  by mouth. HYDRALAZINE (APRESOLINE) 25 MG TABLET    Take 1 Tab by mouth three (3) times daily. NITROGLYCERIN (NITROLINGUAL) 400 MCG/SPRAY SPRAY    1 Spray by SubLINGual route every five (5) minutes as needed for Chest Pain. OMEGA-3-DHA-EPA-FISH OIL (FISH OIL) 1,000 (120-180) MG CAP    Take 2 Caps by mouth two (2) times a day. RAMIPRIL (ALTACE) 10 MG CAPSULE    TAKE 1 CAPSULE BY MOUTH TWICE DAILY  
 RISANKIZUMAB-RZAA (SKYRIZI) 75 MG/0.83 ML SYRG    by SubCUTAneous route. Indications: skyrizi TRIAMCINOLONE ACETONIDE (KENALOG) 0.1 % TOPICAL CREAM    APPLY 1 APPLICATION TO THE ARMS LEGS AND TORSO TWICE DAILY AS NEEDED These Medications have changed No medications on file Stop Taking ALBUTEROL IN    Take 1 Puff by inhalation as needed. DO Steve Frye medical dictation software was used for portions of this report. Unintended transcription errors may occur. My signature above authenticates this document and my orders, the final   
diagnosis (es), discharge prescription (s), and instructions in the Epic   
record.

## 2019-12-17 NOTE — DISCHARGE INSTRUCTIONS
Patient Education        Bronchitis: Care Instructions  Your Care Instructions    Bronchitis is inflammation of the bronchial tubes, which carry air to the lungs. The tubes swell and produce mucus, or phlegm. The mucus and inflamed bronchial tubes make you cough. You may have trouble breathing. Most cases of bronchitis are caused by viruses like those that cause colds. Antibiotics usually do not help and they may be harmful. Bronchitis usually develops rapidly and lasts about 2 to 3 weeks in otherwise healthy people. Follow-up care is a key part of your treatment and safety. Be sure to make and go to all appointments, and call your doctor if you are having problems. It's also a good idea to know your test results and keep a list of the medicines you take. How can you care for yourself at home? · Take all medicines exactly as prescribed. Call your doctor if you think you are having a problem with your medicine. · Get some extra rest.  · Take an over-the-counter pain medicine, such as acetaminophen (Tylenol), ibuprofen (Advil, Motrin), or naproxen (Aleve) to reduce fever and relieve body aches. Read and follow all instructions on the label. · Do not take two or more pain medicines at the same time unless the doctor told you to. Many pain medicines have acetaminophen, which is Tylenol. Too much acetaminophen (Tylenol) can be harmful. · Take an over-the-counter cough medicine that contains dextromethorphan to help quiet a dry, hacking cough so that you can sleep. Avoid cough medicines that have more than one active ingredient. Read and follow all instructions on the label. · Breathe moist air from a humidifier, hot shower, or sink filled with hot water. The heat and moisture will thin mucus so you can cough it out. · Do not smoke. Smoking can make bronchitis worse. If you need help quitting, talk to your doctor about stop-smoking programs and medicines.  These can increase your chances of quitting for good.  When should you call for help? Call 911 anytime you think you may need emergency care. For example, call if:    · You have severe trouble breathing.    Call your doctor now or seek immediate medical care if:    · You have new or worse trouble breathing.     · You cough up dark brown or bloody mucus (sputum).     · You have a new or higher fever.     · You have a new rash.    Watch closely for changes in your health, and be sure to contact your doctor if:    · You cough more deeply or more often, especially if you notice more mucus or a change in the color of your mucus.     · You are not getting better as expected. Where can you learn more? Go to http://zeina-mayra.info/. Enter H333 in the search box to learn more about \"Bronchitis: Care Instructions. \"  Current as of: June 9, 2019  Content Version: 12.2  © 1618-0811 [employer]. Care instructions adapted under license by [employer] (which disclaims liability or warranty for this information). If you have questions about a medical condition or this instruction, always ask your healthcare professional. [address] any warranty or liability for your use of this information. Patient Education        Chest Pain: Care Instructions  Your Care Instructions    There are many things that can cause chest pain. Some are not serious and will get better on their own in a few days. But some kinds of chest pain need more testing and treatment. Your doctor may have recommended a follow-up visit in the next 8 to 12 hours. If you are not getting better, you may need more tests or treatment. Even though your doctor has released you, you still need to watch for any problems. The doctor carefully checked you, but sometimes problems can develop later. If you have new symptoms or if your symptoms do not get better, get medical care right away.   If you have worse or different chest pain or pressure that lasts more than 5 minutes or you passed out (lost consciousness), call 911 or seek other emergency help right away. A medical visit is only one step in your treatment. Even if you feel better, you still need to do what your doctor recommends, such as going to all suggested follow-up appointments and taking medicines exactly as directed. This will help you recover and help prevent future problems. How can you care for yourself at home? · Rest until you feel better. · Take your medicine exactly as prescribed. Call your doctor if you think you are having a problem with your medicine. · Do not drive after taking a prescription pain medicine. When should you call for help? Call 911 if:    · You passed out (lost consciousness).     · You have severe difficulty breathing.     · You have symptoms of a heart attack. These may include:  ? Chest pain or pressure, or a strange feeling in your chest.  ? Sweating. ? Shortness of breath. ? Nausea or vomiting. ? Pain, pressure, or a strange feeling in your back, neck, jaw, or upper belly or in one or both shoulders or arms. ? Lightheadedness or sudden weakness. ? A fast or irregular heartbeat. After you call 911, the  may tell you to chew 1 adult-strength or 2 to 4 low-dose aspirin. Wait for an ambulance. Do not try to drive yourself.    Call your doctor today if:    · You have any trouble breathing.     · Your chest pain gets worse.     · You are dizzy or lightheaded, or you feel like you may faint.     · You are not getting better as expected.     · You are having new or different chest pain. Where can you learn more? Go to http://zeina-mayra.info/. Enter A120 in the search box to learn more about \"Chest Pain: Care Instructions. \"  Current as of: June 26, 2019  Content Version: 12.2  © 2205-6226 [employer].  Care instructions adapted under license by [employer] (which disclaims liability or warranty for this information). If you have questions about a medical condition or this instruction, always ask your healthcare professional. [place] [address] any warranty or liability for your use of this information.

## 2019-12-19 NOTE — PROGRESS NOTES
Patient: Jonnie Essex                MRN: [id number]       SSN: [id number] YOB: 1940        AGE: 70 y.o. SEX: male PCP: John Thao MD 
12/19/19 CC: LEFT KNEE PAIN, PSORIATIC ARTHRITIS HISTORY:  Jonnie Essex is a 70 y.o. male who is seen for increased left knee pain and swelling. He has a long h/o left knee pain related to an old softball injury at age 23. He caught his spikes and twisted his knee while sliding into 3rd base in 1965. He felt his knee pop when his foot caught the ground and his body kept going. He is able to live with his hip pain but is having more trouble with his knee. He experiences right knee pain when walking. He was previously seen for bilateral elbow pain. He plans to see a rheumatologist in January for his elbow pain. He was previously seen for right hip pain. He had significant relief from prior hip and knee cortisone injections. He has been experiencing right hip pain for many years. He reports right buttock lower back pain. He has a h/o severe psoriasis that he has been battling for years with a variety of medications. Pain Assessment  12/19/2019 Location of Pain Knee Location Modifiers Left Severity of Pain 2 Quality of Pain Dull;Aching Duration of Pain Persistent Frequency of Pain Intermittent Aggravating Factors Walking;Standing Limiting Behavior Yes Relieving Factors Rest;Other (Comment) Relieving Factors Comment Tylenol Result of Injury No  
 
Occupation, etc:  Mr. Kian Kurtz he retired in 2013 as a  for [employer]. His son and 3 grand children and other family that live in [place]. He lives with his wife in [place]. He tries to stay active with house and yard work. He has some heart problems. He has a 3 mo puppy--half lhasa opso and half cocker spaniel named [place] He grew up in [place]. Current weight is 205 pounds.     
 
No results found for: HBA1C, HGBE8, BMW7KKYB, YPL0MANX, MFJ4WGUD 
 Weight Metrics 12/19/2019 12/16/2019 12/5/2019 11/21/2019 10/8/2019 10/7/2019 10/2/2019 Weight 194 lb 6.4 oz 190 lb 200 lb 205 lb 3.2 oz 203 lb - 210 lb BMI 25.65 kg/m2 25.07 kg/m2 26.39 kg/m2 27.07 kg/m2 - 26.78 kg/m2 27.71 kg/m2 Patient Active Problem List  
Diagnosis Code  Coronary artery disease, status post stenting of the RCA in May 2005/EF 50%. I25.10  Hypertension  I11.9  Dyslipidemia E78.5  History of right nephrectomy due to renal cell carcinoma in 1998. Z90.5  Mild renal insufficiency N28.9  History of DVT (deep venous thrombosis) in left lower extremity. I82.409  Myalgia, related to statin. M79.10  DESAI (dyspnea on exertion) V37.46  
 Diastolic dysfunction, left ventricle I51.9  
 CAD (coronary artery disease) I25.10 REVIEW OF SYSTEMS: All Below are Negative except: See HPI Constitutional: negative for fever, chills, and weight loss. Cardiovascular: negative for chest pain, claudication, leg swelling, SOB, DESAI Gastrointestinal: Negative for pain, N/V/C/D, Blood in stool or urine, dysuria,  hematuria, incontinence, pelvic pain. Musculoskeletal: See HPI Neurological: Negative for dizziness and weakness. Negative for headaches, Visual changes, confusion, seizures Phychiatric/Behavioral: Negative for depression, memory loss, substance  abuse. Extremities: Negative for hair changes, rash, or skin lesion changes. Hematologic: Negative for bleeding problems, bruising, pallor or swollen lymph  nodes Peripheral Vascular: No calf pain, no circulation deficits. Social History Socioeconomic History  Marital status:  Spouse name: Not on file  Number of children: Not on file  Years of education: Not on file  Highest education level: Not on file Occupational History  Not on file Social Needs  Financial resource strain: Not on file  Food insecurity:  
  Worry: Not on file Inability: Not on file  Transportation needs:  
  Medical: Not on file Non-medical: Not on file Tobacco Use  Smoking status: Former Smoker Years: 40.00 Types: Pipe Last attempt to quit: 2008 Years since quittin.6  Smokeless tobacco: Never Used Substance and Sexual Activity  Alcohol use: No  
 Drug use: No  
 Sexual activity: Not on file Lifestyle  Physical activity:  
  Days per week: Not on file Minutes per session: Not on file  Stress: Not on file Relationships  Social connections:  
  Talks on phone: Not on file Gets together: Not on file Attends Voodoo service: Not on file Active member of club or organization: Not on file Attends meetings of clubs or organizations: Not on file Relationship status: Not on file  Intimate partner violence:  
  Fear of current or ex partner: Not on file Emotionally abused: Not on file Physically abused: Not on file Forced sexual activity: Not on file Other Topics Concern  Not on file Social History Narrative  Not on file Allergies Allergen Reactions  Lipitor [Atorvastatin] Myalgia  Morphine Other (comments) Low blood pressure  Niaspan [Niacin] Not Reported This Time  Statins-Hmg-Coa Reductase Inhibitors Myalgia  Zetia [Ezetimibe] Myalgia Current Outpatient Medications Medication Sig  
 albuterol (PROVENTIL HFA, VENTOLIN HFA, PROAIR HFA) 90 mcg/actuation inhaler Take 2 Puffs by inhalation every four (4) hours as needed for Wheezing or Shortness of Breath. Indications: Asthma Attack  azithromycin (ZITHROMAX Z-DIANA) 250 mg tablet Take as written  benzonatate (TESSALON PERLES) 100 mg capsule Take 2 Caps by mouth three (3) times daily as needed for Cough for up to 7 days.   
 colchicine 0.6 mg tablet TAKE 1 TABLET BY MOUTH TWICE DAILY AS NEEDED  
 triamcinolone acetonide (KENALOG) 0.1 % topical cream APPLY 1 APPLICATION TO THE ARMS LEGS AND TORSO TWICE DAILY AS NEEDED  
 risankizumab-rzaa (SKYRIZI) 75 mg/0.83 mL syrg by SubCUTAneous route. Indications: skyrizi  aspirin delayed-release 81 mg tablet Take 1 Tab by mouth daily.  nitroglycerin (NITROLINGUAL) 400 mcg/spray spray 1 Spray by SubLINGual route every five (5) minutes as needed for Chest Pain.  hydrALAZINE (APRESOLINE) 25 mg tablet Take 1 Tab by mouth three (3) times daily. (Patient taking differently: Take 25 mg by mouth two (2) times a day.)  ramipril (ALTACE) 10 mg capsule TAKE 1 CAPSULE BY MOUTH TWICE DAILY  clopidogrel (PLAVIX) 75 mg tab TAKE ONE TABLET BY MOUTH ONCE DAILY  carvedilol (COREG) 25 mg tablet Take 1 Tab by mouth two (2) times daily (with meals).  ERGOCALCIFEROL, VITAMIN D2, (VITAMIN D2 PO) Take  by mouth.  Omega-3-DHA-EPA-Fish Oil (FISH OIL) 1,000 (120-180) mg Cap Take 2 Caps by mouth two (2) times a day.  alirocumab (PRALUENT PEN) 150 mg/mL injector pen 1 mL by SubCUTAneous route Once every 2 weeks. (Patient taking differently: 150 mg by SubCUTAneous route Once every 2 weeks. Indications: Patient decided not to take this med) No current facility-administered medications for this visit. PHYSICAL EXAMINATION: 
Visit Vitals /53 Pulse 94 Temp 96.8 °F (36 °C) (Oral) Resp 20 Ht 6' 1\" (1.854 m) Wt 194 lb 6.4 oz (88.2 kg) SpO2 98% BMI 25.65 kg/m² ORTHO EXAMINATION: 
Examination Right knee Left knee Skin Intact Psoriatic lesions and discoloration from venous stasis Range of motion 120-0 90-5 Effusion ++ ++ Medial joint line tenderness + + Lateral joint line tenderness - - Popliteal tenderness - -  
Osteophytes palpable - + Rivers - - Patella crepitus - - Anterior drawer - - Lateral laxity - - Medial laxity - - Varus deformity - -  
Valgus deformity - - Pretibial edema - +2 Calf tenderness - - Examination Lumbar Thoracic Skin Psoriatic lesions  Intact Tenderness + - Tightness - - Lordosis Normal N/A Kyphosis N/A Normal  
Scoliosis - - Flexion Fingertips to mid shin N/A Extension 10 N/A Knee reflexes Normal N/A Ankle reflexes Normal N/A Straight leg raise - N/A Calf tenderness - N/A Large red scaly areas over trunk and extremities Small abrasion of left hand from playing with new puppy Examination Right hip Skin Psoriatic lesions External Rotation ROM 10 Internal Rotation ROM 0 Trochanteric tenderness + posterior Hip flexion contracture - Antalgic gait - Trendelenberg sign - Lumbar tenderness - Straight leg raise - Calf tenderness - Neurovascular Intact Examination Right Elbow Left Elbow Skin Intact Intact Range of Motion 125-0 125-0 Tenderness + medial epicondyle + medial epicondyle Swelling - - Bruising - - Stability Normal Normal  
Motor Strength  Normal Normal  
Neurovascular Intact Intact Chart reviewed for the following: 
 Edwina Spivey MD, have reviewed the History, Physical and updated the Allergic reactions for Stephanie Nguyen TIME OUT performed immediately prior to start of procedure: 
Edwina Spivey MD, have performed the following reviews on Stephanie Nguyen prior to the start of the procedure: 
* Patient was identified by name and date of birth * Agreement on procedure being performed was verified * Risks and Benefits explained to the patient * Procedure site verified and marked as necessary * Patient was positioned for comfort * Consent was obtained Time: 3:40 PM 
 
Date of procedure: 12/19/2019 Procedure performed by:  Ara Franklin MD 
Mr. Billie Olivera tolerated the procedure well with no complications. RADIOGRAPHS: 
XR LEFT KNEE 1/15/19 [place] IMPRESSION:  Three views - No fractures, no effusion, moderately severe joint space narrowing, + osteophytes present. IKDC Grade C 
 
XR RIGHT HIP 1/15/19 [place] IMPRESSION:  AP pelvis and two views - No fractures, moderate joint space narrowing, + osteophytes present. [place] grade 2 IMPRESSION:   
  ICD-10-CM ICD-9-CM 1. Primary osteoarthritis of left knee M17.12 715.16 NH DRAIN/INJECT LARGE JOINT/BURSA EUFLEXXA INJECTION PER DOSE  
   sodium hyaluronate (SUPARTZ FX/HYALGAN/GENIVSC) 10 mg/mL syrg injection 2. Chronic pain of left knee M25.562 719.46 NH DRAIN/INJECT LARGE JOINT/BURSA  
 G89.29 338.29 EUFLEXXA INJECTION PER DOSE  
   sodium hyaluronate (SUPARTZ FX/HYALGAN/GENIVSC) 10 mg/mL syrg injection 3. [facility] L40.50 696.0 PLAN:  Rheumatology follow up. After discussing treatment options, patient's left knee was injected with 2 cc Euflexxa. There is no need for surgery at this time. He will follow up in one week for Euflexxa #2.  
 
Scribed by Kayleen Torres ([address]) as dictated by Dariana Obregon MD

## (undated) DEVICE — SET FLD ADMIN 3 W STPCOCK FIX FEM L BOR 1IN

## (undated) DEVICE — RADIFOCUS OPTITORQUE ANGIOGRAPHIC CATHETER: Brand: OPTITORQUE

## (undated) DEVICE — DEVICE INFL W ACCS + HEMSTAS VLV INSRT TOOL AND TORQ BASIX

## (undated) DEVICE — PACK PROCEDURE SURG VASC CATH 161 MMC LF

## (undated) DEVICE — GLIDESHEATH SLENDER STAINLESS STEEL KIT: Brand: GLIDESHEATH SLENDER

## (undated) DEVICE — NC TREK CORONARY DILATATION CATHETER 3.0 MM X 12 MM / RAPID-EXCHANGE: Brand: NC TREK

## (undated) DEVICE — CATHETER GUID 6FR L100CM GRN PTFE XBLAD3.5 TRUELUMEN HYBRID

## (undated) DEVICE — HI-TORQUE CROSS-IT 100XT GUIDE WIRE W/HYDROCOAT HYDROPHILIC COATING .014 STRAIGHT TIP  3.0 CM X 190 CM: Brand: CROSS-IT

## (undated) DEVICE — PRESSURE MONITORING SET: Brand: TRUWAVE

## (undated) DEVICE — GUIDEWIRE VASC L260CM DIA0035IN TIP L3MM PTFE J STD TAPR FIX

## (undated) DEVICE — PROCEDURE KIT FLUID MGMT 10 FR CUST MAINFOLD

## (undated) DEVICE — DRAPE,ANGIO,BRACH,STERILE,38X44: Brand: MEDLINE

## (undated) DEVICE — COPILOT KIT INCLUDES BLEEDBACK CONTROL VALVE 20/30 INDEFLATOR INFLATION DEVICE 30 ATM 20 CC / GUIDE WIRE INTRODUCER / TORQUE DEVICE: Brand: INDEFLATOR

## (undated) DEVICE — TREK CORONARY DILATATION CATHETER 2.75 MM X 20 MM / RAPID-EXCHANGE: Brand: TREK

## (undated) DEVICE — BAND HEMOSTAT DRY D-STAT RAD --

## (undated) DEVICE — HI-TORQUE BALANCE GUIDE WIRE W/HYDROCOAT .014 STRAIGHT TIP 3.0 CM X 190 CM: Brand: HI-TORQUE BALANCE